# Patient Record
Sex: FEMALE | Race: BLACK OR AFRICAN AMERICAN | Employment: UNEMPLOYED | URBAN - METROPOLITAN AREA
[De-identification: names, ages, dates, MRNs, and addresses within clinical notes are randomized per-mention and may not be internally consistent; named-entity substitution may affect disease eponyms.]

---

## 2021-11-10 ENCOUNTER — HOSPITAL ENCOUNTER (INPATIENT)
Age: 51
LOS: 2 days | Discharge: HOME | End: 2021-11-12
Attending: STUDENT IN AN ORGANIZED HEALTH CARE EDUCATION/TRAINING PROGRAM | Admitting: STUDENT IN AN ORGANIZED HEALTH CARE EDUCATION/TRAINING PROGRAM
Payer: MEDICAID

## 2021-11-10 DIAGNOSIS — E87.6 HYPOKALEMIA: ICD-10-CM

## 2021-11-10 DIAGNOSIS — F10.10 ALCOHOL ABUSE: Primary | ICD-10-CM

## 2021-11-10 DIAGNOSIS — I10 HYPERTENSION: ICD-10-CM

## 2021-11-10 PROBLEM — R06.00 DYSPNEA: Status: ACTIVE | Noted: 2021-11-10

## 2021-11-10 PROCEDURE — 74011250637 HC RX REV CODE- 250/637: Performed by: STUDENT IN AN ORGANIZED HEALTH CARE EDUCATION/TRAINING PROGRAM

## 2021-11-10 PROCEDURE — G0378 HOSPITAL OBSERVATION PER HR: HCPCS

## 2021-11-10 PROCEDURE — 65270000029 HC RM PRIVATE

## 2021-11-10 RX ORDER — AMLODIPINE BESYLATE 10 MG/1
10 TABLET ORAL DAILY
Status: ON HOLD | COMMUNITY
End: 2021-11-12 | Stop reason: SDUPTHER

## 2021-11-10 RX ORDER — SODIUM CHLORIDE 0.9 % (FLUSH) 0.9 %
5-40 SYRINGE (ML) INJECTION AS NEEDED
Status: DISCONTINUED | OUTPATIENT
Start: 2021-11-10 | End: 2021-11-12 | Stop reason: HOSPADM

## 2021-11-10 RX ORDER — POLYETHYLENE GLYCOL 3350 17 G/17G
17 POWDER, FOR SOLUTION ORAL DAILY PRN
Status: DISCONTINUED | OUTPATIENT
Start: 2021-11-10 | End: 2021-11-12 | Stop reason: HOSPADM

## 2021-11-10 RX ORDER — ONDANSETRON 2 MG/ML
4 INJECTION INTRAMUSCULAR; INTRAVENOUS
Status: DISCONTINUED | OUTPATIENT
Start: 2021-11-10 | End: 2021-11-12 | Stop reason: HOSPADM

## 2021-11-10 RX ORDER — ACETAMINOPHEN 325 MG/1
650 TABLET ORAL
Status: DISCONTINUED | OUTPATIENT
Start: 2021-11-10 | End: 2021-11-12 | Stop reason: HOSPADM

## 2021-11-10 RX ORDER — ALPRAZOLAM 0.5 MG/1
1 TABLET ORAL ONCE
Status: COMPLETED | OUTPATIENT
Start: 2021-11-10 | End: 2021-11-10

## 2021-11-10 RX ORDER — ENOXAPARIN SODIUM 100 MG/ML
40 INJECTION SUBCUTANEOUS DAILY
Status: DISCONTINUED | OUTPATIENT
Start: 2021-11-11 | End: 2021-11-12 | Stop reason: HOSPADM

## 2021-11-10 RX ORDER — AMLODIPINE BESYLATE 5 MG/1
10 TABLET ORAL DAILY
Status: DISCONTINUED | OUTPATIENT
Start: 2021-11-11 | End: 2021-11-12 | Stop reason: HOSPADM

## 2021-11-10 RX ORDER — ESCITALOPRAM OXALATE 10 MG/1
10 TABLET ORAL DAILY
Status: DISCONTINUED | OUTPATIENT
Start: 2021-11-11 | End: 2021-11-12 | Stop reason: HOSPADM

## 2021-11-10 RX ORDER — ACETAMINOPHEN 650 MG/1
650 SUPPOSITORY RECTAL
Status: DISCONTINUED | OUTPATIENT
Start: 2021-11-10 | End: 2021-11-12 | Stop reason: HOSPADM

## 2021-11-10 RX ORDER — LISINOPRIL 40 MG/1
40 TABLET ORAL DAILY
Status: DISCONTINUED | OUTPATIENT
Start: 2021-11-11 | End: 2021-11-12 | Stop reason: HOSPADM

## 2021-11-10 RX ORDER — HYDROCHLOROTHIAZIDE 25 MG/1
25 TABLET ORAL DAILY
Status: DISCONTINUED | OUTPATIENT
Start: 2021-11-11 | End: 2021-11-12 | Stop reason: HOSPADM

## 2021-11-10 RX ORDER — SODIUM CHLORIDE 0.9 % (FLUSH) 0.9 %
5-40 SYRINGE (ML) INJECTION EVERY 8 HOURS
Status: DISCONTINUED | OUTPATIENT
Start: 2021-11-10 | End: 2021-11-12 | Stop reason: HOSPADM

## 2021-11-10 RX ORDER — ONDANSETRON 4 MG/1
4 TABLET, ORALLY DISINTEGRATING ORAL
Status: DISCONTINUED | OUTPATIENT
Start: 2021-11-10 | End: 2021-11-12 | Stop reason: HOSPADM

## 2021-11-10 RX ORDER — DIAZEPAM 10 MG/2ML
20 INJECTION INTRAMUSCULAR
Status: DISCONTINUED | OUTPATIENT
Start: 2021-11-10 | End: 2021-11-12 | Stop reason: HOSPADM

## 2021-11-10 RX ORDER — DIAZEPAM 10 MG/2ML
10 INJECTION INTRAMUSCULAR
Status: DISCONTINUED | OUTPATIENT
Start: 2021-11-10 | End: 2021-11-12 | Stop reason: HOSPADM

## 2021-11-10 RX ADMIN — ALPRAZOLAM 1 MG: 0.5 TABLET ORAL at 17:34

## 2021-11-10 RX ADMIN — Medication 10 ML: at 21:53

## 2021-11-10 RX ADMIN — Medication 10 ML: at 17:35

## 2021-11-10 NOTE — PROGRESS NOTES
TRANSFER - IN REPORT:    Verbal report received from [facility] (name) on Tegan Yo  being received from [facility] ED (unit) for routine progression of care      Report consisted of patients Situation, Background, Assessment and   Recommendations(SBAR). Information from the following report(s) SBAR, Kardex, ED Summary, Intake/Output, MAR and Recent Results was reviewed with the receiving nurse. Opportunity for questions and clarification was provided. Assessment completed upon patients arrival to unit and care assumed.

## 2021-11-10 NOTE — ACP (ADVANCE CARE PLANNING)
Advance Care Planning Note      NAME: Ottoniel Del Real   :  1970   MRN:  [id number]     Date/Time:  11/10/2021 4:46 PM    Active Diagnoses:  Hospital Problems  Date Reviewed: 2013          Codes Class Noted POA    Dyspnea ICD-10-CM: R06.00  ICD-9-CM: 786.09  11/10/2021 Unknown              These active diagnoses are of sufficient risk that focused discussion on advance care planning is indicated in order to allow the patient to thoughtfully consider personal goals of care, and if situations arise that prevent the ability to personally give input, to ensure appropriate representation of their personal desires for different levels and aggressiveness of care. Discussion:   Code status addressed and wants to be a Full Code. Patient wants central line and vasopressors if needed. Patient would also want a feeding tube, if needed, for nutritional support. Patient  would like to assign her mother Pattie Smith  as the surrogate decision maker. Persons present and participating in discussion: Luis Eduardo Campos MD      Time Spent:   Total time spent face-to-face in education and discussion:   16  minutes.          Luis Eduardo Aguilar MD   Hospitalist

## 2021-11-10 NOTE — H&P
Hospitalist Admission Note    NAME: Linda St   :  1970   MRN:  [id number]     Date/Time:  11/10/2021 1:38 PM    Patient PCP: Real Madison MD  ______________________________________________________________________  Given the patient's current clinical presentation, I have a high level of concern for decompensation if discharged from the emergency department. Complex decision making was performed, which includes reviewing the patient's available past medical records, laboratory results, and x-ray films. My assessment of this patient's clinical condition and my plan of care is as follows. Assessment / Plan:    Exertional dyspnea, diaphoresis, shortness of breath  -Presented with above symptoms x 2-3 days  -Panic attack vs ACS vs hot flash vs hyperthyroidism  -EKG negative for acute ischemic changes  -Chest x-ray shows no acute infiltrate  -Rapid COVID and influenza tests -ve  -Admit for observation and ACS rule out  -Initial troponin negative at outside ED. Will trend troponin.  -We will obtain echocardiogram   -Check TSH, proBNP  -Telemetry monitoring    Electrolyte imbalance: Hypokalemia, hypomagnesemia  -Potassium 3 and mag 1.1 at outside ED  -Likely secondary to HCTZ  -Replaced  -We will check labs in a.m. Hypertension  -Continue home medications    ? EtOH abuse  -History of EtOH abuse per chart review. Patient reports drinking only 1 to 2 glasses of wine about 4 times per week, drinks vodka occasionally  -Tremors on exam and appears very anxious  -We will give a dose of Ativan. Start CIWA protocol. IV fluids with multivitamins for 24 hours. Anxiety/depression  -Continue Lexapro    Code Status: Full code  Surrogate Decision Maker: Her mother kennedy Marie    DVT Prophylaxis: Lovenox  GI Prophylaxis: not indicated    Baseline:  Independent      Subjective:   CHIEF COMPLAINT: Diaphoresis, shaking, shortness of breath    HISTORY OF PRESENT ILLNESS:     Linda St is a 46 y.o.  female with past medical history of hypertension and anxiety who presented with complaint of generalized weakness, dyspnea, diaphoresis that has been going on for the last couple of days. Patient is transferred from [address] [place] [address] emergency at [facility]. Symptoms srated 3 days ago on Monday night when she had an episode of vomiting/ She woke up the next day feeling quesy. She went to the garage to grab gingerale and on her way back she suddenly started feeling shaky, sweating profusely, and her legs were \"weak like noodles\". She had difficulty breathing and was thought she was going to pass out. She was panting when she gets back to her room. She sat down, started feeling better about 20 minutes later. She denies chest pain or palpitation. She experienced similar symptoms this morning when she was walking from the kitchen to her bed room prompting her to come to the ED. patient has history of anxiety but denies recent major stressor or history of panic attacks. She was notably shaking and anxious at the time of my evaluation. She drinks alcohol 1 to 2 glasses of wine up to 4 times per week and drinks vodka occasionally. Denies history of alcohol withdrawal symptoms. She denies self or family history of MI  Her last menses was 3 months ago. At [facility] ED her labs were remarkable for potassium of 3,, magnesium of 1.1, and lactic acid of 4.8. Her creatinine was elevated at 1.2. She was given IV fluid and her electrolytes were replaced. Repeat lactate 1.8. Chest x-ray was negative for acute infiltrate. EKG shows normal sinus rhythm. We were asked to admit for work up and evaluation of the above problems. Past Medical History:   Diagnosis Date    Anxiety     Depression     Hypertension     Ill-defined condition     ETOH abuse        No past surgical history on file.     Social History     Tobacco Use    Smoking status: Never Smoker    Smokeless tobacco: Not on file Substance Use Topics    Alcohol use: Yes     Alcohol/week: 0.0 standard drinks     Comment: occasional        Family History   Problem Relation Age of Onset    Elevated Lipids Mother     Hypertension Mother    [facility] Elevated Lipids Father     Hypertension Brother    [facility] Elevated Lipids Brother     Hypertension Sister     Hypertension Sister      Allergies   Allergen Reactions    Codeine Nausea and Vomiting    Bactrim [Sulfamethoprim Ds] Rash     Severe whole body rash. Prior to Admission medications    Medication Sig Start Date End Date Taking? Authorizing Provider   atenolol (TENORMIN) 100 mg tablet Take 100 mg by mouth daily. Provider, [place]   potassium chloride (K-DUR, KLOR-CON) 20 mEq tablet Take 1 tablet by mouth two (2) times a day. 10/30/14   Jackson Arango MD   hydrochlorothiazide (HYDRODIURIL) 25 mg tablet Take 25 mg by mouth daily. Other, MD Chuyita   lisinopril (PRINIVIL, ZESTRIL) 40 mg tablet Take 1 tablet by mouth daily. 10/8/14   Jackson Arango MD       REVIEW OF SYSTEMS:     I am not able to complete the review of systems because:    The patient is intubated and sedated    The patient has altered mental status due to his acute medical problems    The patient has baseline aphasia from prior stroke(s)    The patient has baseline dementia and is not reliable historian    The patient is in acute medical distress and unable to provide information           Total of 12 systems reviewed as follows:       POSITIVE= underlined text  Negative = text not underlined  General:  fever, chills, sweats, generalized weakness, weight loss/gain,      loss of appetite   Eyes:    blurred vision, eye pain, loss of vision, double vision  ENT:    rhinorrhea, pharyngitis   Respiratory:   cough, sputum production, SOB, QUIROZ, wheezing, pleuritic pain   Cardiology:   chest pain, palpitations, orthopnea, PND, edema, syncope   Gastrointestinal:  abdominal pain , N/V, diarrhea, dysphagia, constipation, bleeding   Genitourinary:  frequency, urgency, dysuria, hematuria, incontinence   Muskuloskeletal :  arthralgia, myalgia, back pain  Hematology:  easy bruising, nose or gum bleeding, lymphadenopathy   Dermatological: rash, ulceration, pruritis, color change / jaundice  Endocrine:   hot flashes or polydipsia   Neurological:  headache, dizziness, confusion, focal weakness, paresthesia,     Speech difficulties, memory loss, gait difficulty  Psychological: Feelings of anxiety, depression, agitation    Objective:   VITALS:    There were no vitals taken for this visit. PHYSICAL EXAM:    General:    Alert, cooperative, no distress, appears stated age. HEENT: Atraumatic, anicteric sclerae, pink conjunctivae     No oral ulcers, mucosa moist, throat clear, dentition fair  Neck:  Supple, symmetrical,  thyroid: non tender  Lungs:   Clear to auscultation bilaterally. No Wheezing or Rhonchi. No rales. Chest wall:  No tenderness  No Accessory muscle use. Heart:   Regular  rhythm,  No  murmur   No edema  Abdomen:   Soft, non-tender. Not distended. Bowel sounds normal  Extremities: No cyanosis. No clubbing,      Skin turgor normal, Capillary refill normal, Radial dial pulse 2+  Skin:     Not pale. Not Jaundiced  No rashes   Psych:  Good insight. Not depressed. Anxious  Neurologic: EOMs intact. No facial asymmetry. No aphasia or slurred speech. Symmetrical strength, Sensation grossly intact. Alert and oriented X 4.   Upper extremity fine tremors    _______________________________________________________________________  Care Plan discussed with:    Comments   Patient x    Family      RN     Care Manager                    Consultant:      _______________________________________________________________________  Expected  Disposition:   Home with Family x   HH/PT/OT/RN    SNF/LTC    DANIEL    ________________________________________________________________________  TOTAL TIME:  39 Minutes    Critical Care Provided Minutes non procedure based      Comments    x Reviewed previous records   >50% of visit spent in counseling and coordination of care x Discussion with patient and/or family and questions answered       ________________________________________________________________________  Signed: Elvira Galloway MD    Procedures: see electronic medical records for all procedures/Xrays and details which were not copied into this note but were reviewed prior to creation of Plan. LAB DATA REVIEWED:    No results found for this or any previous visit (from the past 24 hour(s)).

## 2021-11-11 ENCOUNTER — APPOINTMENT (OUTPATIENT)
Dept: NON INVASIVE DIAGNOSTICS | Age: 51
End: 2021-11-11
Attending: STUDENT IN AN ORGANIZED HEALTH CARE EDUCATION/TRAINING PROGRAM
Payer: MEDICAID

## 2021-11-11 LAB
ALBUMIN SERPL-MCNC: 3.9 G/DL (ref 3.5–5)
ALBUMIN/GLOB SERPL: 1.1 {RATIO} (ref 1.1–2.2)
ALP SERPL-CCNC: 60 U/L (ref 45–117)
ALT SERPL-CCNC: 50 U/L (ref 12–78)
ANION GAP SERPL CALC-SCNC: 9 MMOL/L (ref 5–15)
AST SERPL-CCNC: 74 U/L (ref 15–37)
BASOPHILS # BLD: 0 K/UL (ref 0–0.1)
BASOPHILS NFR BLD: 0 % (ref 0–1)
BILIRUB DIRECT SERPL-MCNC: 0.2 MG/DL (ref 0–0.2)
BILIRUB SERPL-MCNC: 0.7 MG/DL (ref 0.2–1)
BNP SERPL-MCNC: 118 PG/ML
BUN SERPL-MCNC: 14 MG/DL (ref 6–20)
BUN/CREAT SERPL: 14 (ref 12–20)
CALCIUM SERPL-MCNC: 8.3 MG/DL (ref 8.5–10.1)
CHLORIDE SERPL-SCNC: 102 MMOL/L (ref 97–108)
CO2 SERPL-SCNC: 24 MMOL/L (ref 21–32)
CREAT SERPL-MCNC: 0.98 MG/DL (ref 0.55–1.02)
D DIMER PPP FEU-MCNC: 0.23 MG/L FEU (ref 0–0.65)
DIFFERENTIAL METHOD BLD: ABNORMAL
ECHO AV AREA PEAK VELOCITY: 1.91 CM2
ECHO AV AREA PEAK VELOCITY: 1.92 CM2
ECHO AV AREA PEAK VELOCITY: 1.93 CM2
ECHO AV AREA PEAK VELOCITY: 1.94 CM2
ECHO AV AREA VTI: 1.9 CM2
ECHO AV AREA/BSA VTI: 1.1 CM2/M2
ECHO AV MEAN GRADIENT: 4 MMHG
ECHO AV PEAK GRADIENT: 6.52 MMHG
ECHO AV PEAK GRADIENT: 6.59 MMHG
ECHO AV PEAK VELOCITY: 127.65 CM/S
ECHO AV PEAK VELOCITY: 128.36 CM/S
ECHO AV VTI: 20.2 CM
ECHO LA MAJOR AXIS: 2.56 CM
ECHO LA MINOR AXIS: 1.47 CM
ECHO LV E' LATERAL VELOCITY: 5.11 CM/S
ECHO LV E' SEPTAL VELOCITY: 5.08 CM/S
ECHO LV EDV A4C: 77.76 ML
ECHO LV EDV INDEX A4C: 44.7 ML/M2
ECHO LV EJECTION FRACTION A4C: 60 PERCENT
ECHO LV ESV A4C: 31.08 ML
ECHO LV ESV INDEX A4C: 17.9 ML/M2
ECHO LV INTERNAL DIMENSION DIASTOLIC: 3.54 CM (ref 3.9–5.3)
ECHO LV INTERNAL DIMENSION SYSTOLIC: 2.6 CM
ECHO LV IVSD: 0.88 CM (ref 0.6–0.9)
ECHO LV MASS 2D: 91.1 G (ref 67–162)
ECHO LV MASS INDEX 2D: 52.4 G/M2 (ref 43–95)
ECHO LV POSTERIOR WALL DIASTOLIC: 0.93 CM (ref 0.6–0.9)
ECHO LVOT DIAM: 1.85 CM
ECHO LVOT PEAK GRADIENT: 3.32 MMHG
ECHO LVOT PEAK GRADIENT: 3.37 MMHG
ECHO LVOT PEAK VELOCITY: 91.08 CM/S
ECHO LVOT PEAK VELOCITY: 91.84 CM/S
ECHO LVOT SV: 38.4 ML
ECHO LVOT VTI: 14.23 CM
ECHO PV MAX VELOCITY: 139.11 CM/S
ECHO PV MAX VELOCITY: 143.41 CM/S
ECHO PV MEAN GRADIENT: 3.76 MMHG
ECHO PV PEAK INSTANTANEOUS GRADIENT SYSTOLIC: 7.74 MMHG
ECHO PV PEAK INSTANTANEOUS GRADIENT SYSTOLIC: 8.23 MMHG
EOSINOPHIL # BLD: 0 K/UL (ref 0–0.4)
EOSINOPHIL NFR BLD: 0 % (ref 0–7)
ERYTHROCYTE [DISTWIDTH] IN BLOOD BY AUTOMATED COUNT: 15.3 % (ref 11.5–14.5)
GLOBULIN SER CALC-MCNC: 3.6 G/DL (ref 2–4)
GLUCOSE SERPL-MCNC: 87 MG/DL (ref 65–100)
HCT VFR BLD AUTO: 33.7 % (ref 35–47)
HGB BLD-MCNC: 11.1 G/DL (ref 11.5–16)
IMM GRANULOCYTES # BLD AUTO: 0.1 K/UL (ref 0–0.04)
IMM GRANULOCYTES NFR BLD AUTO: 1 % (ref 0–0.5)
LVOT MG: 2.17 MMHG
LYMPHOCYTES # BLD: 1.9 K/UL (ref 0.8–3.5)
LYMPHOCYTES NFR BLD: 26 % (ref 12–49)
MAGNESIUM SERPL-MCNC: 1.3 MG/DL (ref 1.6–2.4)
MCH RBC QN AUTO: 27.6 PG (ref 26–34)
MCHC RBC AUTO-ENTMCNC: 32.9 G/DL (ref 30–36.5)
MCV RBC AUTO: 83.8 FL (ref 80–99)
MONOCYTES # BLD: 0.4 K/UL (ref 0–1)
MONOCYTES NFR BLD: 6 % (ref 5–13)
NEUTS SEG # BLD: 4.9 K/UL (ref 1.8–8)
NEUTS SEG NFR BLD: 67 % (ref 32–75)
NRBC # BLD: 0 K/UL (ref 0–0.01)
NRBC BLD-RTO: 0 PER 100 WBC
PLATELET # BLD AUTO: 214 K/UL (ref 150–400)
PMV BLD AUTO: 9.5 FL (ref 8.9–12.9)
POTASSIUM SERPL-SCNC: 3.6 MMOL/L (ref 3.5–5.1)
PROT SERPL-MCNC: 7.5 G/DL (ref 6.4–8.2)
RBC # BLD AUTO: 4.02 M/UL (ref 3.8–5.2)
SODIUM SERPL-SCNC: 135 MMOL/L (ref 136–145)
TROPONIN-HIGH SENSITIVITY: <4 NG/L (ref 0–51)
TSH SERPL DL<=0.05 MIU/L-ACNC: 2.38 UIU/ML (ref 0.36–3.74)
WBC # BLD AUTO: 7.4 K/UL (ref 3.6–11)

## 2021-11-11 PROCEDURE — 74011000250 HC RX REV CODE- 250: Performed by: STUDENT IN AN ORGANIZED HEALTH CARE EDUCATION/TRAINING PROGRAM

## 2021-11-11 PROCEDURE — 84443 ASSAY THYROID STIM HORMONE: CPT

## 2021-11-11 PROCEDURE — 84484 ASSAY OF TROPONIN QUANT: CPT

## 2021-11-11 PROCEDURE — 93306 TTE W/DOPPLER COMPLETE: CPT | Performed by: INTERNAL MEDICINE

## 2021-11-11 PROCEDURE — 74011250636 HC RX REV CODE- 250/636: Performed by: INTERNAL MEDICINE

## 2021-11-11 PROCEDURE — 93306 TTE W/DOPPLER COMPLETE: CPT

## 2021-11-11 PROCEDURE — 74011250636 HC RX REV CODE- 250/636: Performed by: STUDENT IN AN ORGANIZED HEALTH CARE EDUCATION/TRAINING PROGRAM

## 2021-11-11 PROCEDURE — 74011250637 HC RX REV CODE- 250/637: Performed by: STUDENT IN AN ORGANIZED HEALTH CARE EDUCATION/TRAINING PROGRAM

## 2021-11-11 PROCEDURE — 80076 HEPATIC FUNCTION PANEL: CPT

## 2021-11-11 PROCEDURE — 74011250637 HC RX REV CODE- 250/637: Performed by: INTERNAL MEDICINE

## 2021-11-11 PROCEDURE — G0378 HOSPITAL OBSERVATION PER HR: HCPCS

## 2021-11-11 PROCEDURE — 65270000029 HC RM PRIVATE

## 2021-11-11 PROCEDURE — 85025 COMPLETE CBC W/AUTO DIFF WBC: CPT

## 2021-11-11 PROCEDURE — 80048 BASIC METABOLIC PNL TOTAL CA: CPT

## 2021-11-11 PROCEDURE — 83880 ASSAY OF NATRIURETIC PEPTIDE: CPT

## 2021-11-11 PROCEDURE — 36415 COLL VENOUS BLD VENIPUNCTURE: CPT

## 2021-11-11 PROCEDURE — 85379 FIBRIN DEGRADATION QUANT: CPT

## 2021-11-11 PROCEDURE — 83735 ASSAY OF MAGNESIUM: CPT

## 2021-11-11 RX ORDER — CHLORDIAZEPOXIDE HYDROCHLORIDE 5 MG/1
10 CAPSULE, GELATIN COATED ORAL 3 TIMES DAILY
Status: DISCONTINUED | OUTPATIENT
Start: 2021-11-11 | End: 2021-11-12 | Stop reason: HOSPADM

## 2021-11-11 RX ORDER — ASPIRIN 325 MG/1
100 TABLET, FILM COATED ORAL DAILY
Status: DISCONTINUED | OUTPATIENT
Start: 2021-11-11 | End: 2021-11-12 | Stop reason: HOSPADM

## 2021-11-11 RX ORDER — MAGNESIUM SULFATE HEPTAHYDRATE 40 MG/ML
2 INJECTION, SOLUTION INTRAVENOUS ONCE
Status: COMPLETED | OUTPATIENT
Start: 2021-11-11 | End: 2021-11-12

## 2021-11-11 RX ADMIN — CHLORDIAZEPOXIDE HYDROCHLORIDE 10 MG: 5 CAPSULE ORAL at 11:47

## 2021-11-11 RX ADMIN — AMLODIPINE BESYLATE 10 MG: 5 TABLET ORAL at 10:09

## 2021-11-11 RX ADMIN — THIAMINE HYDROCHLORIDE: 100 INJECTION, SOLUTION INTRAMUSCULAR; INTRAVENOUS at 11:47

## 2021-11-11 RX ADMIN — LISINOPRIL 40 MG: 40 TABLET ORAL at 10:09

## 2021-11-11 RX ADMIN — HYDROCHLOROTHIAZIDE 25 MG: 25 TABLET ORAL at 10:09

## 2021-11-11 RX ADMIN — Medication 10 ML: at 21:55

## 2021-11-11 RX ADMIN — MAGNESIUM SULFATE HEPTAHYDRATE 2 G: 40 INJECTION, SOLUTION INTRAVENOUS at 11:50

## 2021-11-11 RX ADMIN — THIAMINE HCL TAB 100 MG 100 MG: 100 TAB at 11:47

## 2021-11-11 RX ADMIN — ACETAMINOPHEN 650 MG: 325 TABLET ORAL at 10:09

## 2021-11-11 RX ADMIN — Medication 10 ML: at 06:08

## 2021-11-11 RX ADMIN — ESCITALOPRAM OXALATE 10 MG: 10 TABLET ORAL at 10:09

## 2021-11-11 RX ADMIN — CHLORDIAZEPOXIDE HYDROCHLORIDE 10 MG: 5 CAPSULE ORAL at 17:29

## 2021-11-11 RX ADMIN — ENOXAPARIN SODIUM 40 MG: 100 INJECTION SUBCUTANEOUS at 10:09

## 2021-11-11 RX ADMIN — Medication 10 ML: at 14:00

## 2021-11-11 RX ADMIN — CHLORDIAZEPOXIDE HYDROCHLORIDE 10 MG: 5 CAPSULE ORAL at 21:53

## 2021-11-11 RX ADMIN — DIAZEPAM 20 MG: 5 INJECTION, SOLUTION INTRAMUSCULAR; INTRAVENOUS at 10:08

## 2021-11-11 NOTE — PROGRESS NOTES
Transition of Care Plan:    RUR: Observation  Disposition: Home w/ follow-up appts  Follow up appointments: PCP (needs new pt appt) & specialists as indicated  DME needed: None  Transportation at Discharge: Mother  Josh Nava or means to access home:  Family has access  IM Medicare Letter: N/A  Is patient a BCPI-A Bundle: No       If yes, was Bundle Letter given?: N/A    Caregiver Contact: MotherGayle Whyte, [phone]  Discharge Caregiver contacted prior to discharge? CM acknowledged observation status. Oral and Written notification given to patient and/or caregiver informing them that they are currently an Outpatient receiving care in our facility. Outpatient services include Observation Services. CM reviewed pt's chart. [facility]; pt started on CIWA protocol d/t hx of EtOH misuse. Per chart review, pt has hx of voluntary psych placement at [facility] (2013). CM completed initial assessment by bedside phone. Pt confirmed demographic information- updated emergency contacts & reports PCP on file is not accurate. Pt reports moving from [facility] & has not found a local PCP; agreeable to CM providing list of BS providers & securing new-pt appt w/ providers close to her home address. Pt lives w/ her parents in single level/ 2 VEGA house. Independent at baseline; does not drive. Family assists w/ transportation. [facility] or SNF/ rehab. No current needs identified. Pt reports her mother tran transport at discharge. [place] [facility]. No further questions or concerns reported at this time. Unit CM to follow. Reason for Admission: Dyspnea                    RUR Score:     Obs                 Plan for utilizing home health:     No needs identified at this time    PCP: First and Last name: None- will need new PCP    Name of Practice:    Are you a current patient: Yes/No:    Approximate date of last visit:    Can you participate in a virtual visit with your PCP:                     Current Advanced Directive/Advance Care Plan: Full Code  Advance Care Planning     General Advance Care Planning (ACP) Conversation  Date of Conversation: 11/11/2021  Conducted with: Patient with Andrés 153:   No healthcare decision makers have been documented. Click here to complete [address] including selection of the Healthcare Decision Maker Relationship (ie \"Primary\")    Today we discussed [address]. The patient is considering options. Content/Action Overview:   Has NO ACP documents/care preferences - information provided, considering goals and options  Reviewed DNR/DNI and patient elects Full Code (Attempt Resuscitation)    Length of Voluntary ACP Conversation in minutes:  <16 minutes (Non-Billable)    [address] Management Interventions  PCP Verified by CM:  (pt has no PCP)  Palliative Care Criteria Met (RRAT>21 & CHF Dx)?: No  Mode of Transport at Discharge:  Other (see comment) (Parents)  Transition of Care Consult (CM Consult): Discharge Planning  Discharge Durable Medical Equipment: No  Physical Therapy Consult: No  Occupational Therapy Consult: No  Speech Therapy Consult: No  Support Systems: Parent(s)  Confirm Follow Up Transport: Family  The Plan for Transition of Care is Related to the Following Treatment Goals : Home w/ follow-up appts  The Patient and/or Patient Representative was Provided with a Choice of Provider and Agrees with the Discharge Plan?: Yes  Discharge Location  Discharge Placement: Home with family assistance    KRUPA Childress  Care Management

## 2021-11-11 NOTE — PROGRESS NOTES
Hospitalist Progress Note    NAME: Cristopher Vera   :  1970   MRN:  [id number]       Assessment / Plan:  Exertional dyspnea, diaphoresis, shortness of breath  Suspect alcohol withdrawal  -Presented with above symptoms x 2-3 days  -Panic attack vs ACS vs hot flash vs hyperthyroidism   -EKG/troponin negative. CXR negative. -Vaccinated for COVID-19. Rapid Covid and influenza test negative    -Suspect her symptoms are multifactorial including likely alcohol withdrawal  -Check D-dimer, troponin  -Check LFT, add on from morning labs  -Echo pending       Electrolyte imbalance: Hypokalemia, hypomagnesemia, suspect from alcohol  -Potassium 3 and mag 1.1 at outside ED  -Likely secondary to HCTZ/alcohol  -Replacing     Hypertension  -Continue home medications     ? EtOH abuse  -P.o. thiamine  -Librium 10 mg scheduled, continue CIWA protocol     Anxiety/depression  -Continue Lexapro     Code Status: Full code  Surrogate Decision Maker: Her mother Nikole Haji     DVT Prophylaxis: Lovenox  GI Prophylaxis: not indicated     Baseline: Independent    Anticipated discharge date      Subjective:     Chief Complaint / Reason for Physician Visit  \" She seems very anxious, has minimal tremors. Denies significant alcohol use, but she is not saying excessive amount of alcohol\". Discussed with RN events overnight. Review of Systems:  Symptom Y/N Comments  Symptom Y/N Comments   Fever/Chills    Chest Pain     Poor Appetite    Edema     Cough    Abdominal Pain     Sputum    Joint Pain     SOB/QUIROZ    Pruritis/Rash     Nausea/vomit    Tolerating PT/OT     Diarrhea    Tolerating Diet     Constipation    Other       Could NOT obtain due to:      Objective:     VITALS:   Last 24hrs VS reviewed since prior progress note.  Most recent are:  Patient Vitals for the past 24 hrs:   Temp Pulse Resp BP SpO2   21 0857 98.3 °F (36.8 °C) (!) 116 20 (!) 134/95 98 %   21 0316 98.5 °F (36.9 °C) (!) 126 22 (!) 140/94 98 % 11/10/21 2334 97.6 °F (36.4 °C) (!) 105 18 124/79 97 %   11/10/21 2107 97.5 °F (36.4 °C) (!) 106 18 130/83 98 %   11/10/21 1622 98.7 °F (37.1 °C) (!) 112 18 128/85 95 %   11/10/21 1150 98.2 °F (36.8 °C) 100 18 122/87 95 %     No intake or output data in the 24 hours ending 11/11/21 1059     I had a face to face encounter and independently examined this patient on 11/11/2021, as outlined below:  PHYSICAL EXAM:  General: WD, WN. Alert, cooperative, no acute distress    EENT:  EOMI. Anicteric sclerae. MMM  Resp:  CTA bilaterally, no wheezing or rales. No accessory muscle use  CV:  Regular  rhythm,  No edema  GI:  Soft, Non distended, Non tender. +Bowel sounds  Neurologic:  Alert and oriented X 3, normal speech,   Psych:   Good insight. anxious+  Skin:  No rashes. No jaundice    Reviewed most current lab test results and cultures  YES  Reviewed most current radiology test results   YES  Review and summation of old records today    NO  Reviewed patient's current orders and MAR    YES  PMH/ reviewed - no change compared to H&P  ________________________________________________________________________  Care Plan discussed with:    Comments   Patient     Family      RN     Care Manager     Consultant                        Multidiciplinary team rounds were held today with , nursing, pharmacist and clinical coordinator. Patient's plan of care was discussed; medications were reviewed and discharge planning was addressed.      ________________________________________________________________________  Total NON critical care TIME:  23   Minutes    Total CRITICAL CARE TIME Spent:   Minutes non procedure based      Comments   >50% of visit spent in counseling and coordination of care     ________________________________________________________________________  Eren Mixon MD     Procedures: see electronic medical records for all procedures/Xrays and details which were not copied into this note but were reviewed prior to creation of Plan. LABS:  I reviewed today's most current labs and imaging studies.   Pertinent labs include:  Recent Labs     11/11/21 0115   WBC 7.4   HGB 11.1*   HCT 33.7*        Recent Labs     11/11/21 0115   *   K 3.6      CO2 24   GLU 87   BUN 14   CREA 0.98   CA 8.3*   MG 1.3*       Signed: Thomas Grajeda MD

## 2021-11-11 NOTE — PROGRESS NOTES
Bedside shift change report given to Kim Reynaga (oncoming nurse) by Keenan Vila RN (offgoing nurse). Report included the following information SBAR, Kardex, Intake/Output, MAR and Recent Results    Shift worked: 7am-7pm     Shift summary and any significant changes:    Patient admitted today to the unit. No significant changes noted.       Concerns for physician to address: Tachycardia      Zone phone for oncoming shift:  N/A

## 2021-11-12 VITALS
HEIGHT: 65 IN | DIASTOLIC BLOOD PRESSURE: 90 MMHG | HEART RATE: 115 BPM | BODY MASS INDEX: 24.49 KG/M2 | TEMPERATURE: 98.6 F | WEIGHT: 147 LBS | SYSTOLIC BLOOD PRESSURE: 144 MMHG | OXYGEN SATURATION: 96 % | RESPIRATION RATE: 18 BRPM

## 2021-11-12 LAB
ALBUMIN SERPL-MCNC: 4 G/DL (ref 3.5–5)
ALBUMIN/GLOB SERPL: 1 {RATIO} (ref 1.1–2.2)
ALP SERPL-CCNC: 58 U/L (ref 45–117)
ALT SERPL-CCNC: 54 U/L (ref 12–78)
ANION GAP SERPL CALC-SCNC: 6 MMOL/L (ref 5–15)
AST SERPL-CCNC: 73 U/L (ref 15–37)
BASOPHILS # BLD: 0 K/UL (ref 0–0.1)
BASOPHILS NFR BLD: 0 % (ref 0–1)
BILIRUB DIRECT SERPL-MCNC: 0.1 MG/DL (ref 0–0.2)
BILIRUB SERPL-MCNC: 0.6 MG/DL (ref 0.2–1)
BUN SERPL-MCNC: 15 MG/DL (ref 6–20)
BUN/CREAT SERPL: 15 (ref 12–20)
CALCIUM SERPL-MCNC: 8.7 MG/DL (ref 8.5–10.1)
CHLORIDE SERPL-SCNC: 102 MMOL/L (ref 97–108)
CO2 SERPL-SCNC: 26 MMOL/L (ref 21–32)
CREAT SERPL-MCNC: 0.97 MG/DL (ref 0.55–1.02)
DIFFERENTIAL METHOD BLD: ABNORMAL
EOSINOPHIL # BLD: 0 K/UL (ref 0–0.4)
EOSINOPHIL NFR BLD: 0 % (ref 0–7)
ERYTHROCYTE [DISTWIDTH] IN BLOOD BY AUTOMATED COUNT: 15.1 % (ref 11.5–14.5)
GLOBULIN SER CALC-MCNC: 4.1 G/DL (ref 2–4)
GLUCOSE SERPL-MCNC: 111 MG/DL (ref 65–100)
HCT VFR BLD AUTO: 37 % (ref 35–47)
HGB BLD-MCNC: 12.1 G/DL (ref 11.5–16)
IMM GRANULOCYTES # BLD AUTO: 0.1 K/UL (ref 0–0.04)
IMM GRANULOCYTES NFR BLD AUTO: 1 % (ref 0–0.5)
LYMPHOCYTES # BLD: 1.5 K/UL (ref 0.8–3.5)
LYMPHOCYTES NFR BLD: 22 % (ref 12–49)
MAGNESIUM SERPL-MCNC: 2.4 MG/DL (ref 1.6–2.4)
MCH RBC QN AUTO: 27.6 PG (ref 26–34)
MCHC RBC AUTO-ENTMCNC: 32.7 G/DL (ref 30–36.5)
MCV RBC AUTO: 84.3 FL (ref 80–99)
MONOCYTES # BLD: 0.5 K/UL (ref 0–1)
MONOCYTES NFR BLD: 7 % (ref 5–13)
NEUTS SEG # BLD: 4.7 K/UL (ref 1.8–8)
NEUTS SEG NFR BLD: 70 % (ref 32–75)
NRBC # BLD: 0 K/UL (ref 0–0.01)
NRBC BLD-RTO: 0 PER 100 WBC
PLATELET # BLD AUTO: 217 K/UL (ref 150–400)
PMV BLD AUTO: 9.4 FL (ref 8.9–12.9)
POTASSIUM SERPL-SCNC: 3.5 MMOL/L (ref 3.5–5.1)
PROT SERPL-MCNC: 8.1 G/DL (ref 6.4–8.2)
RBC # BLD AUTO: 4.39 M/UL (ref 3.8–5.2)
SODIUM SERPL-SCNC: 134 MMOL/L (ref 136–145)
WBC # BLD AUTO: 6.8 K/UL (ref 3.6–11)

## 2021-11-12 PROCEDURE — G0378 HOSPITAL OBSERVATION PER HR: HCPCS

## 2021-11-12 PROCEDURE — 94760 N-INVAS EAR/PLS OXIMETRY 1: CPT

## 2021-11-12 PROCEDURE — 74011250637 HC RX REV CODE- 250/637: Performed by: INTERNAL MEDICINE

## 2021-11-12 PROCEDURE — 83735 ASSAY OF MAGNESIUM: CPT

## 2021-11-12 PROCEDURE — 80076 HEPATIC FUNCTION PANEL: CPT

## 2021-11-12 PROCEDURE — 80048 BASIC METABOLIC PNL TOTAL CA: CPT

## 2021-11-12 PROCEDURE — 36415 COLL VENOUS BLD VENIPUNCTURE: CPT

## 2021-11-12 PROCEDURE — 74011250636 HC RX REV CODE- 250/636: Performed by: STUDENT IN AN ORGANIZED HEALTH CARE EDUCATION/TRAINING PROGRAM

## 2021-11-12 PROCEDURE — 85025 COMPLETE CBC W/AUTO DIFF WBC: CPT

## 2021-11-12 PROCEDURE — 74011250637 HC RX REV CODE- 250/637: Performed by: STUDENT IN AN ORGANIZED HEALTH CARE EDUCATION/TRAINING PROGRAM

## 2021-11-12 RX ORDER — LORAZEPAM 1 MG/1
1 TABLET ORAL
Qty: 6 TABLET | Refills: 0 | Status: SHIPPED | OUTPATIENT
Start: 2021-11-12 | End: 2021-11-15

## 2021-11-12 RX ORDER — AMLODIPINE BESYLATE 10 MG/1
10 TABLET ORAL DAILY
Qty: 30 TABLET | Refills: 1 | Status: SHIPPED | OUTPATIENT
Start: 2021-11-12

## 2021-11-12 RX ORDER — ESCITALOPRAM OXALATE 10 MG/1
10 TABLET ORAL DAILY
Qty: 30 TABLET | Refills: 0 | Status: SHIPPED | OUTPATIENT
Start: 2021-11-13

## 2021-11-12 RX ORDER — ASPIRIN 325 MG/1
100 TABLET, FILM COATED ORAL DAILY
Qty: 30 TABLET | Refills: 0 | Status: SHIPPED | OUTPATIENT
Start: 2021-11-13

## 2021-11-12 RX ORDER — POTASSIUM CHLORIDE 20 MEQ/1
20 TABLET, EXTENDED RELEASE ORAL DAILY
Qty: 15 TABLET | Refills: 0 | Status: SHIPPED | OUTPATIENT
Start: 2021-11-12

## 2021-11-12 RX ORDER — HYDROCHLOROTHIAZIDE 25 MG/1
25 TABLET ORAL DAILY
Qty: 30 TABLET | Refills: 0 | Status: SHIPPED | OUTPATIENT
Start: 2021-11-12

## 2021-11-12 RX ORDER — LISINOPRIL 40 MG/1
40 TABLET ORAL DAILY
Qty: 30 TABLET | Refills: 1 | Status: SHIPPED | OUTPATIENT
Start: 2021-11-12

## 2021-11-12 RX ADMIN — CHLORDIAZEPOXIDE HYDROCHLORIDE 10 MG: 5 CAPSULE ORAL at 08:37

## 2021-11-12 RX ADMIN — LISINOPRIL 40 MG: 40 TABLET ORAL at 08:37

## 2021-11-12 RX ADMIN — THIAMINE HCL TAB 100 MG 100 MG: 100 TAB at 08:37

## 2021-11-12 RX ADMIN — HYDROCHLOROTHIAZIDE 25 MG: 25 TABLET ORAL at 08:37

## 2021-11-12 RX ADMIN — ENOXAPARIN SODIUM 40 MG: 100 INJECTION SUBCUTANEOUS at 08:37

## 2021-11-12 RX ADMIN — AMLODIPINE BESYLATE 10 MG: 5 TABLET ORAL at 08:37

## 2021-11-12 RX ADMIN — ONDANSETRON 4 MG: 4 TABLET, ORALLY DISINTEGRATING ORAL at 08:42

## 2021-11-12 RX ADMIN — Medication 10 ML: at 06:04

## 2021-11-12 RX ADMIN — ESCITALOPRAM OXALATE 10 MG: 10 TABLET ORAL at 08:37

## 2021-11-12 RX ADMIN — ACETAMINOPHEN 650 MG: 325 TABLET ORAL at 08:37

## 2021-11-12 NOTE — PROGRESS NOTES
Transition of Care Plan:     RUR:   Observation  Disposition: Home w/ follow-up appts  Follow up appointments: PCP (needs new pt appt) - appointment scheduled and AVS updated   DME needed: None  Transportation at Discharge: Mother  Jaqueline Altamirano or means to access home:  Family has access  IM Medicare Letter: N/A  Is patient a BCPI-A Bundle: No                  If yes, was Bundle Letter given?: N/A    Caregiver Contact: Mother- Doretha Loyola, [phone]  Discharge Caregiver contacted prior to discharge? Initial Note: Chart reviewed. CM acknowledged discharge order. CM attempted to review discharge plan with pt however pt had already left the hospital. PCP follow up appointment scheduled. AVS previously updated. SMART tool clipped to pt door. Care Management Interventions  PCP Verified by CM: Yes  Palliative Care Criteria Met (RRAT>21 & CHF Dx)?: No  Mode of Transport at Discharge:  Other (see comment) (Mother to transport. )  Transition of Care Consult (CM Consult): Discharge Planning  Discharge Durable Medical Equipment: No  Physical Therapy Consult: No  Occupational Therapy Consult: No  Speech Therapy Consult: No  Support Systems: Parent(s)  Confirm Follow Up Transport: Family  The Plan for Transition of Care is Related to the Following Treatment Goals : Home w/ follow-up appts  The Patient and/or Patient Representative was Provided with a Choice of Provider and Agrees with the Discharge Plan?: Yes  Freedom of Choice List was Provided with Basic Dialogue that Supports the Patient's Individualized Plan of Care/Goals, Treatment Preferences and Shares the Quality Data Associated with the Providers?: No  [place] Resource Information Provided?: No  Discharge Location  Discharge Placement: Home with family assistance    KRUPA Ojeda  Care Manager, [address] [place] [address]

## 2021-11-12 NOTE — DISCHARGE INSTRUCTIONS
HOSPITALIST DISCHARGE INSTRUCTIONS    NAME: Matthew De   :  1970   MRN:  [id number]     Date/Time:  2021 9:07 AM    ADMIT DATE: 11/10/2021   DISCHARGE DATE: 2021     alcohol withdrawal  anxiety   Hypokalemia, hypomagnesemia  Hypertension  ? EtOH abuse  Anxiety/depression    · It is important that you take the medication exactly as they are prescribed. · Keep your medication in the bottles provided by the pharmacist and keep a list of the medication names, dosages, and times to be taken in your wallet. · Do not take other medications without consulting your doctor. What to do at [address]:  Resume previous diet    Recommended activity: Activity as tolerated      If you have questions regarding the hospital related prescriptions or hospital related issues please call [facility] at [phone]. You can always direct your questions to your primary care doctor if you are unable to reach your hospital physician; your PCP works as an extension of your hospital doctor just like your hospital doctor is an extension of your PCP for your time at the [facility])    If you experience any of the following symptoms then please call your primary care physician or return to the emergency room if you cannot get hold of your doctor:    Fever, chills, nausea, vomiting, or persistent diarrhea  Worsening weakness or new problems with your speech or balance  Dark stools or visible blood in your stools  New Leg swelling or shortness of breath as these could be signs of a clot    Additional Instructions:      Bring these papers with you to your follow up appointments. The papers will help your doctors be sure to continue the care plan from the hospital.              Information obtained by :  I understand that if any problems occur once I am at home I am to contact my physician. I understand and acknowledge receipt of the instructions indicated above. Physician's or R.N.'s Signature                                                                  Date/Time                                                                                                                                              Patient or Representative Signature

## 2021-11-12 NOTE — DISCHARGE SUMMARY
Hospitalist Discharge Summary     Patient ID:  Adore Feliciano  [id number]  68 y.o.  1970  11/10/2021    PCP on record: Nguyen Colmenares MD    Admit date: 11/10/2021  Discharge date and time: 11/12/2021    DISCHARGE DIAGNOSIS:    alcohol withdrawal  anxiety   Hypokalemia, hypomagnesemia  Hypertension  ? EtOH abuse  Anxiety/depression    CONSULTATIONS:  None    Excerpted HPI from H&P of Giselle Ruano MD:  Adore Feliciano is a 46 y.o.  female with past medical history of hypertension and anxiety who presented with complaint of generalized weakness, dyspnea, diaphoresis that has been going on for the last couple of days. Patient is transferred from [address] [place] [address] emergency at [facility]. Symptoms srated 3 days ago on Monday night when she had an episode of vomiting/ She woke up the next day feeling quesy. She went to the garage to grab gingerale and on her way back she suddenly started feeling shaky, sweating profusely, and her legs were \"weak like noodles\". She had difficulty breathing and was thought she was going to pass out. She was panting when she gets back to her room. She sat down, started feeling better about 20 minutes later. She denies chest pain or palpitation. She experienced similar symptoms this morning when she was walking from the kitchen to her bed room prompting her to come to the ED. patient has history of anxiety but denies recent major stressor or history of panic attacks. She was notably shaking and anxious at the time of my evaluation. She drinks alcohol 1 to 2 glasses of wine up to 4 times per week and drinks vodka occasionally. Denies history of alcohol withdrawal symptoms. She denies self or family history of MI  Her last menses was 3 months ago.    ______________________________________________________________________  DISCHARGE SUMMARY/HOSPITAL COURSE:  for full details see H&P, daily progress notes, labs, consult notes.      Exertional dyspnea, diaphoresis, shortness of breath  Suspect alcohol withdrawal  -Presented with above symptoms x 2-3 days  -Symptoms likely multifactorial including alcohol withdrawal.  She had significant hypomagnesemia and hypokalemia which is consistent with alcohol abuse. -EKG/troponin negative. CXR negative. -Vaccinated for COVID-19. Rapid Covid and influenza test negative     Her D-dimer was negative. Troponin was negative. Echocardiogram was unremarkable  She was started on Librium 10 mg scheduled, which had increased her anxiety  She will be given as needed lorazepam to take home. She was strongly advised to quit alcohol     Electrolyte imbalance: Hypokalemia, hypomagnesemia, suspect from alcohol  -Potassium 3 and mag 1.1 at outside ED  -Likely secondary to HCTZ/alcohol  Her electrolytes normalized     Hypertension  -Continue home medications     EtOH abuse  -P.o. thiamine  -Librium 10 mg scheduled, continue CIWA protocol     Anxiety/depression  -Continue Lexapro. She has been inconsistently using her meds. Strongly advised to continue Lexapro, and advised to discuss with the PCP before stopping any medication           _______________________________________________________________________  Patient seen and examined by me on discharge day. Pertinent Findings:  Gen:    Not in distress  Chest: Clear lungs  CVS:   Regular rhythm. No edema  Abd:  Soft, not distended, not tender  Neuro:  Alert,   _______________________________________________________________________  DISCHARGE MEDICATIONS:   Discharge Medication List as of 11/12/2021  9:25 AM      START taking these medications    Details   thiamine mononitrate (B-1) 100 mg tablet Take 1 Tablet by mouth daily. , Normal, Disp-30 Tablet, R-0      LORazepam (ATIVAN) 1 mg tablet Take 1 Tablet by mouth every eight (8) hours as needed for Anxiety (Alcohol withdrawl/tremors) for up to 3 days.  Max Daily Amount: 3 mg., Normal, Disp-6 Tablet, R-0      escitalopram oxalate (LEXAPRO) 10 mg tablet Take 1 Tablet by mouth daily. , Normal, Disp-30 Tablet, R-0         CONTINUE these medications which have CHANGED    Details   amLODIPine (NORVASC) 10 mg tablet Take 1 Tablet by mouth daily. , Normal, Disp-30 Tablet, R-1      lisinopriL (PRINIVIL, ZESTRIL) 40 mg tablet Take 1 Tablet by mouth daily. , Normal, Disp-30 Tablet, R-1      hydroCHLOROthiazide (HYDRODIURIL) 25 mg tablet Take 1 Tablet by mouth daily. , Normal, Disp-30 Tablet, R-0      potassium chloride (K-DUR, KLOR-CON) 20 mEq tablet Take 1 Tablet by mouth daily. , Normal, Disp-15 Tablet, R-0               Patient Follow Up Instructions: Activity: Activity as tolerated    Follow-up Information     Follow up With Specialties Details Why Contact Info    August Garcia MD Family Medicine Go on 11/19/2021 at 10:30 AM for new-patient PCP appointment.  Please arrive by 10:15 AM and call office upon arrival. [address]  [address] [facility] Thais Bonner MD Addiction Medicine   . [address]  [address] [facility],[address] [place] [phone]          ________________________________________________________________    Risk of deterioration: Low    Condition at Discharge:  Stable  __________________________________________________________________    Disposition  Home with family, no needs    ____________________________________________________________________    Code Status: Full Code  ___________________________________________________________________      Total time in minutes spent coordinating this discharge (includes going over instructions, follow-up, prescriptions, and preparing report for sign off to her PCP) :  >30 minutes    Signed:  Cynthia Johnson MD

## 2021-11-12 NOTE — PROGRESS NOTES
Pt given d/c instructions and voices understanding of continued care plan, follow up appts and medications changes. Pt released to mothers care clinically stable.

## 2021-11-12 NOTE — PROGRESS NOTES
.  Katerina Ortega. November 12, 2021       RE: Mini Lakhani      To Whom It May Concern,    This is to certify that Mini Lakhani may return to work on Nov 16,2021. Please feel free to contact my office if you have any questions or concerns. Thank you for your assistance in this matter.       Sincerely,  Terese Mariano MD

## 2022-03-19 PROBLEM — R06.00 DYSPNEA: Status: ACTIVE | Noted: 2021-11-10

## 2022-08-10 ENCOUNTER — TRANSCRIBE ORDER (OUTPATIENT)
Dept: SCHEDULING | Age: 52
End: 2022-08-10

## 2022-08-10 DIAGNOSIS — Z12.31 VISIT FOR SCREENING MAMMOGRAM: Primary | ICD-10-CM

## 2022-09-07 ENCOUNTER — HOSPITAL ENCOUNTER (OUTPATIENT)
Dept: MAMMOGRAPHY | Age: 52
Discharge: HOME | End: 2022-09-07
Attending: NURSE PRACTITIONER
Payer: MEDICAID

## 2022-09-07 DIAGNOSIS — Z12.31 VISIT FOR SCREENING MAMMOGRAM: ICD-10-CM

## 2022-09-07 PROCEDURE — 77067 SCR MAMMO BI INCL CAD: CPT

## 2022-10-26 ENCOUNTER — HOSPITAL ENCOUNTER (OUTPATIENT)
Dept: MAMMOGRAPHY | Age: 52
Discharge: HOME | End: 2022-10-26
Attending: NURSE PRACTITIONER

## 2022-10-26 ENCOUNTER — HOSPITAL ENCOUNTER (OUTPATIENT)
Dept: ULTRASOUND IMAGING | Age: 52
Discharge: HOME | End: 2022-10-26
Attending: NURSE PRACTITIONER

## 2022-10-26 DIAGNOSIS — R92.8 ABNORMAL MAMMOGRAM OF LEFT BREAST: ICD-10-CM

## 2022-10-26 DIAGNOSIS — N64.89 BREAST ASYMMETRY: ICD-10-CM

## 2022-11-02 ENCOUNTER — TRANSCRIBE ORDER (OUTPATIENT)
Dept: SCHEDULING | Age: 52
End: 2022-11-02

## 2022-11-02 DIAGNOSIS — R92.8 ABNORMAL MAMMOGRAM: Primary | ICD-10-CM

## 2022-12-29 ENCOUNTER — HOSPITAL ENCOUNTER (OUTPATIENT)
Dept: MAMMOGRAPHY | Age: 52
Discharge: HOME | End: 2022-12-29
Attending: NURSE PRACTITIONER
Payer: MEDICAID

## 2022-12-29 DIAGNOSIS — R92.8 ABNORMAL MAMMOGRAM: ICD-10-CM

## 2022-12-29 DIAGNOSIS — R92.8 ABNORMAL MAMMOGRAM OF RIGHT BREAST: ICD-10-CM

## 2022-12-29 DIAGNOSIS — N64.89 BREAST ASYMMETRY: ICD-10-CM

## 2022-12-29 PROCEDURE — 77061 BREAST TOMOSYNTHESIS UNI: CPT

## 2023-04-07 ENCOUNTER — HOSPITAL ENCOUNTER (OUTPATIENT)
Age: 53
End: 2023-04-07
Attending: STUDENT IN AN ORGANIZED HEALTH CARE EDUCATION/TRAINING PROGRAM
Payer: MEDICAID

## 2023-09-15 ENCOUNTER — HOSPITAL ENCOUNTER (OUTPATIENT)
Facility: HOSPITAL | Age: 53
End: 2023-09-15
Payer: MEDICAID

## 2023-09-15 VITALS — HEIGHT: 65 IN | BODY MASS INDEX: 24.16 KG/M2 | WEIGHT: 145 LBS

## 2023-09-15 DIAGNOSIS — Z12.31 VISIT FOR SCREENING MAMMOGRAM: ICD-10-CM

## 2023-09-15 PROCEDURE — 77063 BREAST TOMOSYNTHESIS BI: CPT

## 2025-05-05 ENCOUNTER — APPOINTMENT (OUTPATIENT)
Facility: HOSPITAL | Age: 55
DRG: 204 | End: 2025-05-05
Payer: MEDICAID

## 2025-05-05 ENCOUNTER — HOSPITAL ENCOUNTER (INPATIENT)
Facility: HOSPITAL | Age: 55
LOS: 2 days | Discharge: HOME | DRG: 204 | End: 2025-05-07
Attending: EMERGENCY MEDICINE | Admitting: HOSPITALIST
Payer: MEDICAID

## 2025-05-05 DIAGNOSIS — R51.9 ACUTE NONINTRACTABLE HEADACHE, UNSPECIFIED HEADACHE TYPE: ICD-10-CM

## 2025-05-05 DIAGNOSIS — R42 DIZZINESS: ICD-10-CM

## 2025-05-05 DIAGNOSIS — R06.00 DYSPNEA: Primary | ICD-10-CM

## 2025-05-05 PROBLEM — R55 SYNCOPE AND COLLAPSE: Status: ACTIVE | Noted: 2025-05-05

## 2025-05-05 LAB
ALBUMIN SERPL-MCNC: 3.3 G/DL (ref 3.5–5)
ALBUMIN/GLOB SERPL: 0.9 (ref 1.1–2.2)
ALP SERPL-CCNC: 105 U/L (ref 45–117)
ALT SERPL-CCNC: 30 U/L (ref 12–78)
ANION GAP SERPL CALC-SCNC: 11 MMOL/L (ref 2–12)
APPEARANCE UR: CLEAR
AST SERPL-CCNC: 58 U/L (ref 15–37)
BACTERIA URNS QL MICRO: NEGATIVE /HPF
BASOPHILS # BLD: 0.03 K/UL (ref 0–0.1)
BASOPHILS NFR BLD: 0.2 % (ref 0–1)
BILIRUB SERPL-MCNC: 0.5 MG/DL (ref 0.2–1)
BILIRUB UR QL: NEGATIVE
BUN SERPL-MCNC: 6 MG/DL (ref 6–20)
BUN/CREAT SERPL: 6 (ref 12–20)
CALCIUM SERPL-MCNC: 9.2 MG/DL (ref 8.5–10.1)
CHLORIDE SERPL-SCNC: 92 MMOL/L (ref 97–108)
CO2 SERPL-SCNC: 28 MMOL/L (ref 21–32)
COLOR UR: NORMAL
CREAT SERPL-MCNC: 0.97 MG/DL (ref 0.55–1.02)
DIFFERENTIAL METHOD BLD: ABNORMAL
EOSINOPHIL # BLD: 0.12 K/UL (ref 0–0.4)
EOSINOPHIL NFR BLD: 0.9 % (ref 0–7)
EPITH CASTS URNS QL MICRO: NORMAL /LPF
ERYTHROCYTE [DISTWIDTH] IN BLOOD BY AUTOMATED COUNT: 15.2 % (ref 11.5–14.5)
ETHANOL SERPL-MCNC: 51 MG/DL (ref 0–0.08)
GLOBULIN SER CALC-MCNC: 3.8 G/DL (ref 2–4)
GLUCOSE BLD STRIP.AUTO-MCNC: 122 MG/DL (ref 65–117)
GLUCOSE SERPL-MCNC: 98 MG/DL (ref 65–100)
GLUCOSE UR STRIP.AUTO-MCNC: NEGATIVE MG/DL
HCT VFR BLD AUTO: 28.2 % (ref 35–47)
HGB BLD-MCNC: 9.6 G/DL (ref 11.5–16)
HGB UR QL STRIP: NEGATIVE
IMM GRANULOCYTES # BLD AUTO: 0.23 K/UL (ref 0–0.04)
IMM GRANULOCYTES NFR BLD AUTO: 1.8 % (ref 0–0.5)
KETONES UR QL STRIP.AUTO: NEGATIVE MG/DL
LEUKOCYTE ESTERASE UR QL STRIP.AUTO: NEGATIVE
LYMPHOCYTES # BLD: 2.54 K/UL (ref 0.8–3.5)
LYMPHOCYTES NFR BLD: 19.5 % (ref 12–49)
MAGNESIUM SERPL-MCNC: 1.2 MG/DL (ref 1.6–2.4)
MCH RBC QN AUTO: 36.1 PG (ref 26–34)
MCHC RBC AUTO-ENTMCNC: 34 G/DL (ref 30–36.5)
MCV RBC AUTO: 106 FL (ref 80–99)
MONOCYTES # BLD: 0.75 K/UL (ref 0–1)
MONOCYTES NFR BLD: 5.8 % (ref 5–13)
NEUTS SEG # BLD: 9.37 K/UL (ref 1.8–8)
NEUTS SEG NFR BLD: 71.8 % (ref 32–75)
NITRITE UR QL STRIP.AUTO: NEGATIVE
NRBC # BLD: 0 K/UL (ref 0–0.01)
NRBC BLD-RTO: 0 PER 100 WBC
NT PRO BNP: 95 PG/ML (ref 0–125)
PH UR STRIP: 6.5 (ref 5–8)
PLATELET # BLD AUTO: 281 K/UL (ref 150–400)
PMV BLD AUTO: 8.9 FL (ref 8.9–12.9)
POTASSIUM SERPL-SCNC: 3 MMOL/L (ref 3.5–5.1)
PROT SERPL-MCNC: 7.1 G/DL (ref 6.4–8.2)
PROT UR STRIP-MCNC: NEGATIVE MG/DL
RBC # BLD AUTO: 2.66 M/UL (ref 3.8–5.2)
RBC #/AREA URNS HPF: NORMAL /HPF (ref 0–5)
SERVICE CMNT-IMP: ABNORMAL
SODIUM SERPL-SCNC: 131 MMOL/L (ref 136–145)
SP GR UR REFRACTOMETRY: <1.005
TROPONIN I SERPL HS-MCNC: <4 NG/L (ref 0–51)
URINE CULTURE IF INDICATED: NORMAL
UROBILINOGEN UR QL STRIP.AUTO: 0.2 EU/DL (ref 0.2–1)
WBC # BLD AUTO: 13 K/UL (ref 3.6–11)
WBC URNS QL MICRO: NORMAL /HPF (ref 0–4)

## 2025-05-05 PROCEDURE — 73521 X-RAY EXAM HIPS BI 2 VIEWS: CPT

## 2025-05-05 PROCEDURE — 82962 GLUCOSE BLOOD TEST: CPT

## 2025-05-05 PROCEDURE — 82728 ASSAY OF FERRITIN: CPT

## 2025-05-05 PROCEDURE — 70450 CT HEAD/BRAIN W/O DYE: CPT

## 2025-05-05 PROCEDURE — 93005 ELECTROCARDIOGRAM TRACING: CPT | Performed by: EMERGENCY MEDICINE

## 2025-05-05 PROCEDURE — 83880 ASSAY OF NATRIURETIC PEPTIDE: CPT

## 2025-05-05 PROCEDURE — 83735 ASSAY OF MAGNESIUM: CPT

## 2025-05-05 PROCEDURE — 96361 HYDRATE IV INFUSION ADD-ON: CPT

## 2025-05-05 PROCEDURE — 83540 ASSAY OF IRON: CPT

## 2025-05-05 PROCEDURE — 71045 X-RAY EXAM CHEST 1 VIEW: CPT

## 2025-05-05 PROCEDURE — 6370000000 HC RX 637 (ALT 250 FOR IP): Performed by: EMERGENCY MEDICINE

## 2025-05-05 PROCEDURE — 96375 TX/PRO/DX INJ NEW DRUG ADDON: CPT

## 2025-05-05 PROCEDURE — 83550 IRON BINDING TEST: CPT

## 2025-05-05 PROCEDURE — 36415 COLL VENOUS BLD VENIPUNCTURE: CPT

## 2025-05-05 PROCEDURE — 2580000003 HC RX 258: Performed by: EMERGENCY MEDICINE

## 2025-05-05 PROCEDURE — 6360000002 HC RX W HCPCS: Performed by: EMERGENCY MEDICINE

## 2025-05-05 PROCEDURE — 80053 COMPREHEN METABOLIC PANEL: CPT

## 2025-05-05 PROCEDURE — 85025 COMPLETE CBC W/AUTO DIFF WBC: CPT

## 2025-05-05 PROCEDURE — 1100000000 HC RM PRIVATE

## 2025-05-05 PROCEDURE — 82077 ASSAY SPEC XCP UR&BREATH IA: CPT

## 2025-05-05 PROCEDURE — 6360000002 HC RX W HCPCS: Performed by: HOSPITALIST

## 2025-05-05 PROCEDURE — 99285 EMERGENCY DEPT VISIT HI MDM: CPT

## 2025-05-05 PROCEDURE — 96365 THER/PROPH/DIAG IV INF INIT: CPT

## 2025-05-05 PROCEDURE — 81001 URINALYSIS AUTO W/SCOPE: CPT

## 2025-05-05 PROCEDURE — 84484 ASSAY OF TROPONIN QUANT: CPT

## 2025-05-05 PROCEDURE — 2500000003 HC RX 250 WO HCPCS: Performed by: HOSPITALIST

## 2025-05-05 RX ORDER — 0.9 % SODIUM CHLORIDE 0.9 %
1000 INTRAVENOUS SOLUTION INTRAVENOUS ONCE
Status: COMPLETED | OUTPATIENT
Start: 2025-05-05 | End: 2025-05-05

## 2025-05-05 RX ORDER — KETOROLAC TROMETHAMINE 30 MG/ML
30 INJECTION, SOLUTION INTRAMUSCULAR; INTRAVENOUS
Status: COMPLETED | OUTPATIENT
Start: 2025-05-05 | End: 2025-05-05

## 2025-05-05 RX ORDER — ONDANSETRON 4 MG/1
4 TABLET, ORALLY DISINTEGRATING ORAL EVERY 8 HOURS PRN
Status: DISCONTINUED | OUTPATIENT
Start: 2025-05-05 | End: 2025-05-07 | Stop reason: HOSPADM

## 2025-05-05 RX ORDER — THIAMINE MONONITRATE (VIT B1) 100 MG
100 TABLET ORAL DAILY
Status: ON HOLD | COMMUNITY
End: 2025-05-07 | Stop reason: HOSPADM

## 2025-05-05 RX ORDER — ENOXAPARIN SODIUM 100 MG/ML
40 INJECTION SUBCUTANEOUS DAILY
Status: DISCONTINUED | OUTPATIENT
Start: 2025-05-05 | End: 2025-05-07 | Stop reason: HOSPADM

## 2025-05-05 RX ORDER — ESCITALOPRAM OXALATE 20 MG/1
20 TABLET ORAL DAILY
Status: ON HOLD | COMMUNITY
End: 2025-05-07

## 2025-05-05 RX ORDER — ASPIRIN 300 MG/1
300 SUPPOSITORY RECTAL DAILY
Status: DISCONTINUED | OUTPATIENT
Start: 2025-05-05 | End: 2025-05-05

## 2025-05-05 RX ORDER — SODIUM CHLORIDE 0.9 % (FLUSH) 0.9 %
5-40 SYRINGE (ML) INJECTION PRN
Status: DISCONTINUED | OUTPATIENT
Start: 2025-05-05 | End: 2025-05-07 | Stop reason: HOSPADM

## 2025-05-05 RX ORDER — SODIUM CHLORIDE 9 MG/ML
INJECTION, SOLUTION INTRAVENOUS PRN
Status: DISCONTINUED | OUTPATIENT
Start: 2025-05-05 | End: 2025-05-07 | Stop reason: HOSPADM

## 2025-05-05 RX ORDER — SODIUM CHLORIDE 0.9 % (FLUSH) 0.9 %
5-40 SYRINGE (ML) INJECTION EVERY 12 HOURS SCHEDULED
Status: DISCONTINUED | OUTPATIENT
Start: 2025-05-05 | End: 2025-05-07 | Stop reason: HOSPADM

## 2025-05-05 RX ORDER — TRAZODONE HYDROCHLORIDE 100 MG/1
50-100 TABLET ORAL NIGHTLY PRN
Status: ON HOLD | COMMUNITY
End: 2025-05-07 | Stop reason: HOSPADM

## 2025-05-05 RX ORDER — ONDANSETRON 2 MG/ML
4 INJECTION INTRAMUSCULAR; INTRAVENOUS EVERY 6 HOURS PRN
Status: DISCONTINUED | OUTPATIENT
Start: 2025-05-05 | End: 2025-05-07 | Stop reason: HOSPADM

## 2025-05-05 RX ORDER — MAGNESIUM SULFATE IN WATER 40 MG/ML
2000 INJECTION, SOLUTION INTRAVENOUS
Status: COMPLETED | OUTPATIENT
Start: 2025-05-05 | End: 2025-05-05

## 2025-05-05 RX ORDER — POLYETHYLENE GLYCOL 3350 17 G/17G
17 POWDER, FOR SOLUTION ORAL DAILY PRN
Status: DISCONTINUED | OUTPATIENT
Start: 2025-05-05 | End: 2025-05-07 | Stop reason: HOSPADM

## 2025-05-05 RX ORDER — POTASSIUM CHLORIDE 750 MG/1
40 TABLET, EXTENDED RELEASE ORAL ONCE
Status: COMPLETED | OUTPATIENT
Start: 2025-05-05 | End: 2025-05-05

## 2025-05-05 RX ORDER — ASPIRIN 81 MG/1
81 TABLET, CHEWABLE ORAL DAILY
Status: DISCONTINUED | OUTPATIENT
Start: 2025-05-05 | End: 2025-05-05

## 2025-05-05 RX ADMIN — MAGNESIUM SULFATE HEPTAHYDRATE 2000 MG: 40 INJECTION, SOLUTION INTRAVENOUS at 19:07

## 2025-05-05 RX ADMIN — SODIUM CHLORIDE 1000 ML: 0.9 INJECTION, SOLUTION INTRAVENOUS at 16:45

## 2025-05-05 RX ADMIN — POTASSIUM CHLORIDE 40 MEQ: 750 TABLET, FILM COATED, EXTENDED RELEASE ORAL at 17:52

## 2025-05-05 RX ADMIN — ENOXAPARIN SODIUM 40 MG: 100 INJECTION SUBCUTANEOUS at 21:22

## 2025-05-05 RX ADMIN — SODIUM CHLORIDE, PRESERVATIVE FREE 10 ML: 5 INJECTION INTRAVENOUS at 21:52

## 2025-05-05 RX ADMIN — KETOROLAC TROMETHAMINE 30 MG: 30 INJECTION, SOLUTION INTRAMUSCULAR at 17:51

## 2025-05-05 ASSESSMENT — PAIN SCALES - GENERAL
PAINLEVEL_OUTOF10: 0
PAINLEVEL_OUTOF10: 0

## 2025-05-05 ASSESSMENT — PAIN - FUNCTIONAL ASSESSMENT: PAIN_FUNCTIONAL_ASSESSMENT: 0-10

## 2025-05-05 NOTE — ED NOTES
Emergency Department Nursing Plan of Care       The Nursing Plan of Care is developed from the Nursing assessment and Emergency Department Attending provider initial evaluation.  The plan of care may be reviewed in the “ED Provider note”.      The Plan of Care was developed with the following considerations:  Patient / Family readiness to learn indicated by:verbalized understanding  Persons(s) to be included in education: patient  Barriers to Learning/Limitations:None      Signed     RABIA MICHAEL RN    5/5/2025   5:31 PM

## 2025-05-05 NOTE — ED PROVIDER NOTES
[facility] EMERGENCY DEPARTMENT  EMERGENCY DEPARTMENT ENCOUNTER       Pt Name: Katie Rico  MRN: [id number]  Birthdate 1970  Date of evaluation: 5/5/2025  Provider: Randal Hill MD   PCP: Lorenza Ocampo APRN - NP  Note Started: 8:23 PM 5/5/25     CHIEF COMPLAINT       Chief Complaint   Patient presents with   • Dizziness   • Headache        HISTORY OF PRESENT ILLNESS: 1 or more elements      History From: Patient, History limited by: None     Katie Rico is a 55 y.o. female with a history of hypertension, alcohol use, anemia, depression who presents with lightheadedness.  Patient describes lightheadedness after urinating about an hour ago.  She felt lightheaded after she stood up, fell to the ground.  She reports she has had generalized weakness since then, feels very anxious.  Blood pressure elevated and route, glucose 125 and route otherwise vital signs unremarkable.  Reports feeling \"off\" today however cannot specify exactly.  No changes to her vision.  She feels no lightheadedness or dizziness at rest.    She reports multiple similar episodes in the past.  She does still take her thiazide, does not currently take sodium supplementation.  Endorses ongoing alcohol use.    Patient also endorses chronic bilateral hip pain, worse with walking has been going on for some time.     Nursing Notes were all reviewed and agreed with or any disagreements were addressed in the HPI.     REVIEW OF SYSTEMS        Positives and Pertinent negatives as per HPI.    PAST HISTORY     Past Medical History:  Past Medical History:   Diagnosis Date   • Anxiety    • Depression    • Hypertension    • Ill-defined condition     ETOH abuse       Past Surgical History:  History reviewed. No pertinent surgical history.    Family History:  Family History   Problem Relation Age of Onset   • Hypertension Sister    • Hypertension Sister    • Elevated Lipids Brother    • Hypertension Brother    • Elevated Lipids Father    •

## 2025-05-05 NOTE — ED NOTES
Patient coming to ED today via EMS from [facility] where she is currently residing.  Reports dizziness and near syncope starting about 45 minutes prior to arrival.  Started while getting off toilet after urinating.  Also reports some intermittent numbness in finger tips and toes bilaterally.  No focal weakness.  Initial NIH in ED of 0.  Currently reports headache.  Is requesting something to drink which was okay with Dr. Hill other comfort measures.  Lights dimmed.  Call bell in reach.  Marks and socks provided.  Awaits imaging.

## 2025-05-06 ENCOUNTER — APPOINTMENT (OUTPATIENT)
Facility: HOSPITAL | Age: 55
DRG: 204 | End: 2025-05-06
Payer: MEDICAID

## 2025-05-06 ENCOUNTER — APPOINTMENT (OUTPATIENT)
Facility: HOSPITAL | Age: 55
DRG: 204 | End: 2025-05-06
Attending: HOSPITALIST
Payer: MEDICAID

## 2025-05-06 LAB
AMPHET UR QL SCN: NEGATIVE
ANION GAP SERPL CALC-SCNC: 7 MMOL/L (ref 2–12)
APPEARANCE UR: CLEAR
BACTERIA URNS QL MICRO: NEGATIVE /HPF
BARBITURATES UR QL SCN: NEGATIVE
BENZODIAZ UR QL: POSITIVE
BILIRUB UR QL: NEGATIVE
BUN SERPL-MCNC: 8 MG/DL (ref 6–20)
BUN/CREAT SERPL: 9 (ref 12–20)
CALCIUM SERPL-MCNC: 8.8 MG/DL (ref 8.5–10.1)
CANNABINOIDS UR QL SCN: NEGATIVE
CHLORIDE SERPL-SCNC: 96 MMOL/L (ref 97–108)
CHOLEST SERPL-MCNC: 207 MG/DL
CO2 SERPL-SCNC: 27 MMOL/L (ref 21–32)
COCAINE UR QL SCN: NEGATIVE
COLOR UR: NORMAL
CREAT SERPL-MCNC: 0.9 MG/DL (ref 0.55–1.02)
ECHO AO ASC DIAM: 3.2 CM
ECHO AO ASCENDING AORTA INDEX: 1.82 CM/M2
ECHO AO ROOT DIAM: 3.1 CM
ECHO AO ROOT INDEX: 1.76 CM/M2
ECHO AV AREA PEAK VELOCITY: 2 CM2
ECHO AV AREA VTI: 1.9 CM2
ECHO AV AREA/BSA PEAK VELOCITY: 1.1 CM2/M2
ECHO AV AREA/BSA VTI: 1.1 CM2/M2
ECHO AV MEAN GRADIENT: 3 MMHG
ECHO AV MEAN VELOCITY: 0.8 M/S
ECHO AV PEAK GRADIENT: 5 MMHG
ECHO AV PEAK VELOCITY: 1.2 M/S
ECHO AV VELOCITY RATIO: 0.67
ECHO AV VTI: 21.7 CM
ECHO BSA: 1.78 M2
ECHO EST RA PRESSURE: 3 MMHG
ECHO LA DIAMETER INDEX: 1.53 CM/M2
ECHO LA DIAMETER: 2.7 CM
ECHO LA TO AORTIC ROOT RATIO: 0.87
ECHO LA VOL A-L A2C: 57 ML (ref 22–52)
ECHO LA VOL A-L A4C: 40 ML (ref 22–52)
ECHO LA VOL BP: 49 ML (ref 22–52)
ECHO LA VOL MOD A2C: 52 ML (ref 22–52)
ECHO LA VOL MOD A4C: 38 ML (ref 22–52)
ECHO LA VOL/BSA BIPLANE: 28 ML/M2 (ref 16–34)
ECHO LA VOLUME AREA LENGTH: 53 ML
ECHO LA VOLUME INDEX A-L A2C: 32 ML/M2 (ref 16–34)
ECHO LA VOLUME INDEX A-L A4C: 23 ML/M2 (ref 16–34)
ECHO LA VOLUME INDEX AREA LENGTH: 30 ML/M2 (ref 16–34)
ECHO LA VOLUME INDEX MOD A2C: 30 ML/M2 (ref 16–34)
ECHO LA VOLUME INDEX MOD A4C: 22 ML/M2 (ref 16–34)
ECHO LV EDV A2C: 55 ML
ECHO LV EDV A4C: 67 ML
ECHO LV EDV BP: 61 ML (ref 56–104)
ECHO LV EDV INDEX A4C: 38 ML/M2
ECHO LV EDV INDEX BP: 35 ML/M2
ECHO LV EDV NDEX A2C: 31 ML/M2
ECHO LV EF PHYSICIAN: 60 %
ECHO LV EJECTION FRACTION A2C: 52 %
ECHO LV EJECTION FRACTION A4C: 66 %
ECHO LV EJECTION FRACTION BIPLANE: 60 % (ref 55–100)
ECHO LV ESV A2C: 26 ML
ECHO LV ESV A4C: 23 ML
ECHO LV ESV BP: 25 ML (ref 19–49)
ECHO LV ESV INDEX A2C: 15 ML/M2
ECHO LV ESV INDEX A4C: 13 ML/M2
ECHO LV ESV INDEX BP: 14 ML/M2
ECHO LV FRACTIONAL SHORTENING: 27 % (ref 28–44)
ECHO LV INTERNAL DIMENSION DIASTOLE INDEX: 1.88 CM/M2
ECHO LV INTERNAL DIMENSION DIASTOLIC: 3.3 CM (ref 3.9–5.3)
ECHO LV INTERNAL DIMENSION SYSTOLIC INDEX: 1.36 CM/M2
ECHO LV INTERNAL DIMENSION SYSTOLIC: 2.4 CM
ECHO LV IVSD: 0.9 CM (ref 0.6–0.9)
ECHO LV MASS 2D: 87.7 G (ref 67–162)
ECHO LV MASS INDEX 2D: 49.8 G/M2 (ref 43–95)
ECHO LV POSTERIOR WALL DIASTOLIC: 1 CM (ref 0.6–0.9)
ECHO LV RELATIVE WALL THICKNESS RATIO: 0.61
ECHO LVOT AREA: 2.8 CM2
ECHO LVOT AV VTI INDEX: 0.71
ECHO LVOT DIAM: 1.9 CM
ECHO LVOT MEAN GRADIENT: 2 MMHG
ECHO LVOT PEAK GRADIENT: 3 MMHG
ECHO LVOT PEAK VELOCITY: 0.8 M/S
ECHO LVOT STROKE VOLUME INDEX: 24.6 ML/M2
ECHO LVOT SV: 43.4 ML
ECHO LVOT VTI: 15.3 CM
ECHO MV A VELOCITY: 0.64 M/S
ECHO MV AREA PHT: 11.6 CM2
ECHO MV E DECELERATION TIME (DT): 65.7 MS
ECHO MV E VELOCITY: 0.69 M/S
ECHO MV E/A RATIO: 1.08
ECHO MV PRESSURE HALF TIME (PHT): 19 MS
ECHO RV INTERNAL DIMENSION: 3.2 CM
EPITH CASTS URNS QL MICRO: NORMAL /LPF
ERYTHROCYTE [DISTWIDTH] IN BLOOD BY AUTOMATED COUNT: 15.4 % (ref 11.5–14.5)
EST. AVERAGE GLUCOSE BLD GHB EST-MCNC: 82 MG/DL
FERRITIN SERPL-MCNC: 267 NG/ML (ref 26–388)
FOLATE SERPL-MCNC: 2.2 NG/ML (ref 5–21)
GLUCOSE SERPL-MCNC: 101 MG/DL (ref 65–100)
GLUCOSE UR STRIP.AUTO-MCNC: NEGATIVE MG/DL
HBA1C MFR BLD: 4.5 % (ref 4–5.6)
HCT VFR BLD AUTO: 27.1 % (ref 35–47)
HDLC SERPL-MCNC: 66 MG/DL
HDLC SERPL: 3.1 (ref 0–5)
HGB BLD-MCNC: 9 G/DL (ref 11.5–16)
HGB UR QL STRIP: NEGATIVE
IRON SATN MFR SERPL: 10 % (ref 20–50)
IRON SERPL-MCNC: 32 UG/DL (ref 35–150)
KETONES UR QL STRIP.AUTO: NEGATIVE MG/DL
LDLC SERPL CALC-MCNC: 111.4 MG/DL (ref 0–100)
LEUKOCYTE ESTERASE UR QL STRIP.AUTO: NEGATIVE
Lab: ABNORMAL
MCH RBC QN AUTO: 35.4 PG (ref 26–34)
MCHC RBC AUTO-ENTMCNC: 33.2 G/DL (ref 30–36.5)
MCV RBC AUTO: 106.7 FL (ref 80–99)
METHADONE UR QL: NEGATIVE
NITRITE UR QL STRIP.AUTO: NEGATIVE
NRBC # BLD: 0 K/UL (ref 0–0.01)
NRBC BLD-RTO: 0 PER 100 WBC
OPIATES UR QL: NEGATIVE
PCP UR QL: NEGATIVE
PH UR STRIP: 6.5 (ref 5–8)
PLATELET # BLD AUTO: 274 K/UL (ref 150–400)
PMV BLD AUTO: 9.3 FL (ref 8.9–12.9)
POTASSIUM SERPL-SCNC: 3.9 MMOL/L (ref 3.5–5.1)
PROT UR STRIP-MCNC: NEGATIVE MG/DL
RBC # BLD AUTO: 2.54 M/UL (ref 3.8–5.2)
RBC #/AREA URNS HPF: NORMAL /HPF (ref 0–5)
SODIUM SERPL-SCNC: 130 MMOL/L (ref 136–145)
SODIUM UR-SCNC: 40 MMOL/L
SP GR UR REFRACTOMETRY: 1.01
T4 FREE SERPL-MCNC: 1.3 NG/DL (ref 0.8–1.5)
TIBC SERPL-MCNC: 316 UG/DL (ref 250–450)
TRIGL SERPL-MCNC: 148 MG/DL
TROPONIN I SERPL HS-MCNC: <4 NG/L (ref 0–51)
TSH SERPL DL<=0.05 MIU/L-ACNC: 1.45 UIU/ML (ref 0.36–3.74)
URINE CULTURE IF INDICATED: NORMAL
UROBILINOGEN UR QL STRIP.AUTO: 1 EU/DL (ref 0.2–1)
VIT B12 SERPL-MCNC: 607 PG/ML (ref 193–986)
VLDLC SERPL CALC-MCNC: 29.6 MG/DL
WBC # BLD AUTO: 10.8 K/UL (ref 3.6–11)
WBC URNS QL MICRO: NORMAL /HPF (ref 0–4)

## 2025-05-06 PROCEDURE — 6370000000 HC RX 637 (ALT 250 FOR IP): Performed by: HOSPITALIST

## 2025-05-06 PROCEDURE — 84484 ASSAY OF TROPONIN QUANT: CPT

## 2025-05-06 PROCEDURE — 82746 ASSAY OF FOLIC ACID SERUM: CPT

## 2025-05-06 PROCEDURE — 2500000003 HC RX 250 WO HCPCS: Performed by: HOSPITALIST

## 2025-05-06 PROCEDURE — 6360000002 HC RX W HCPCS: Performed by: HOSPITALIST

## 2025-05-06 PROCEDURE — 80061 LIPID PANEL: CPT

## 2025-05-06 PROCEDURE — 84300 ASSAY OF URINE SODIUM: CPT

## 2025-05-06 PROCEDURE — 81001 URINALYSIS AUTO W/SCOPE: CPT

## 2025-05-06 PROCEDURE — 6370000000 HC RX 637 (ALT 250 FOR IP): Performed by: INTERNAL MEDICINE

## 2025-05-06 PROCEDURE — 85027 COMPLETE CBC AUTOMATED: CPT

## 2025-05-06 PROCEDURE — 97165 OT EVAL LOW COMPLEX 30 MIN: CPT | Performed by: OCCUPATIONAL THERAPIST

## 2025-05-06 PROCEDURE — 36415 COLL VENOUS BLD VENIPUNCTURE: CPT

## 2025-05-06 PROCEDURE — 93306 TTE W/DOPPLER COMPLETE: CPT | Performed by: SPECIALIST

## 2025-05-06 PROCEDURE — 83935 ASSAY OF URINE OSMOLALITY: CPT

## 2025-05-06 PROCEDURE — 93306 TTE W/DOPPLER COMPLETE: CPT

## 2025-05-06 PROCEDURE — 83036 HEMOGLOBIN GLYCOSYLATED A1C: CPT

## 2025-05-06 PROCEDURE — 82607 VITAMIN B-12: CPT

## 2025-05-06 PROCEDURE — 80307 DRUG TEST PRSMV CHEM ANLYZR: CPT

## 2025-05-06 PROCEDURE — 70551 MRI BRAIN STEM W/O DYE: CPT

## 2025-05-06 PROCEDURE — 1100000000 HC RM PRIVATE

## 2025-05-06 PROCEDURE — 97161 PT EVAL LOW COMPLEX 20 MIN: CPT | Performed by: PHYSICAL THERAPIST

## 2025-05-06 PROCEDURE — 97535 SELF CARE MNGMENT TRAINING: CPT | Performed by: OCCUPATIONAL THERAPIST

## 2025-05-06 PROCEDURE — 84443 ASSAY THYROID STIM HORMONE: CPT

## 2025-05-06 PROCEDURE — 84439 ASSAY OF FREE THYROXINE: CPT

## 2025-05-06 RX ORDER — LISINOPRIL 20 MG/1
40 TABLET ORAL DAILY
Status: DISCONTINUED | OUTPATIENT
Start: 2025-05-06 | End: 2025-05-07 | Stop reason: HOSPADM

## 2025-05-06 RX ORDER — ACETAMINOPHEN 500 MG
1000 TABLET ORAL EVERY 6 HOURS PRN
Status: ON HOLD | COMMUNITY
End: 2025-05-07 | Stop reason: HOSPADM

## 2025-05-06 RX ORDER — LANOLIN ALCOHOL/MO/W.PET/CERES
100 CREAM (GRAM) TOPICAL DAILY
Status: DISCONTINUED | OUTPATIENT
Start: 2025-05-07 | End: 2025-05-07 | Stop reason: HOSPADM

## 2025-05-06 RX ORDER — LORAZEPAM 1 MG/1
3 TABLET ORAL
Status: DISCONTINUED | OUTPATIENT
Start: 2025-05-06 | End: 2025-05-07 | Stop reason: HOSPADM

## 2025-05-06 RX ORDER — LORAZEPAM 1 MG/1
4 TABLET ORAL
Status: DISCONTINUED | OUTPATIENT
Start: 2025-05-06 | End: 2025-05-07 | Stop reason: HOSPADM

## 2025-05-06 RX ORDER — LORAZEPAM 2 MG/ML
4 INJECTION INTRAMUSCULAR
Status: DISCONTINUED | OUTPATIENT
Start: 2025-05-06 | End: 2025-05-07 | Stop reason: HOSPADM

## 2025-05-06 RX ORDER — AMLODIPINE BESYLATE 5 MG/1
10 TABLET ORAL DAILY
Status: DISCONTINUED | OUTPATIENT
Start: 2025-05-06 | End: 2025-05-07 | Stop reason: HOSPADM

## 2025-05-06 RX ORDER — AMLODIPINE BESYLATE 5 MG/1
10 TABLET ORAL DAILY
Status: DISCONTINUED | OUTPATIENT
Start: 2025-05-06 | End: 2025-05-06

## 2025-05-06 RX ORDER — FOLIC ACID 1 MG/1
1 TABLET ORAL DAILY
Status: DISCONTINUED | OUTPATIENT
Start: 2025-05-06 | End: 2025-05-07 | Stop reason: HOSPADM

## 2025-05-06 RX ORDER — ESCITALOPRAM OXALATE 10 MG/1
20 TABLET ORAL DAILY
Status: DISCONTINUED | OUTPATIENT
Start: 2025-05-06 | End: 2025-05-07 | Stop reason: HOSPADM

## 2025-05-06 RX ORDER — LORAZEPAM 2 MG/ML
2 INJECTION INTRAMUSCULAR
Status: DISCONTINUED | OUTPATIENT
Start: 2025-05-06 | End: 2025-05-07 | Stop reason: HOSPADM

## 2025-05-06 RX ORDER — MULTIVITAMIN WITH IRON
1 TABLET ORAL DAILY
Status: DISCONTINUED | OUTPATIENT
Start: 2025-05-06 | End: 2025-05-07 | Stop reason: HOSPADM

## 2025-05-06 RX ORDER — LORAZEPAM 1 MG/1
2 TABLET ORAL
Status: DISCONTINUED | OUTPATIENT
Start: 2025-05-06 | End: 2025-05-07 | Stop reason: HOSPADM

## 2025-05-06 RX ORDER — LORAZEPAM 2 MG/ML
1 INJECTION INTRAMUSCULAR
Status: DISCONTINUED | OUTPATIENT
Start: 2025-05-06 | End: 2025-05-07 | Stop reason: HOSPADM

## 2025-05-06 RX ORDER — TRAZODONE HYDROCHLORIDE 50 MG/1
50 TABLET ORAL NIGHTLY
Status: DISCONTINUED | OUTPATIENT
Start: 2025-05-06 | End: 2025-05-07 | Stop reason: HOSPADM

## 2025-05-06 RX ORDER — LANOLIN ALCOHOL/MO/W.PET/CERES
100 CREAM (GRAM) TOPICAL DAILY
Status: DISCONTINUED | OUTPATIENT
Start: 2025-05-06 | End: 2025-05-06

## 2025-05-06 RX ORDER — CLOBETASOL PROPIONATE 0.5 MG/G
CREAM TOPICAL 2 TIMES DAILY PRN
Status: ON HOLD | COMMUNITY
End: 2025-05-07 | Stop reason: HOSPADM

## 2025-05-06 RX ORDER — LORAZEPAM 2 MG/ML
3 INJECTION INTRAMUSCULAR
Status: DISCONTINUED | OUTPATIENT
Start: 2025-05-06 | End: 2025-05-07 | Stop reason: HOSPADM

## 2025-05-06 RX ORDER — HYDROXYZINE HYDROCHLORIDE 25 MG/1
50 TABLET, FILM COATED ORAL 3 TIMES DAILY PRN
Status: DISCONTINUED | OUTPATIENT
Start: 2025-05-06 | End: 2025-05-07 | Stop reason: HOSPADM

## 2025-05-06 RX ORDER — LORAZEPAM 1 MG/1
1 TABLET ORAL
Status: DISCONTINUED | OUTPATIENT
Start: 2025-05-06 | End: 2025-05-07 | Stop reason: HOSPADM

## 2025-05-06 RX ORDER — THIAMINE MONONITRATE (VIT B1) 100 MG
100 TABLET ORAL DAILY
Status: DISCONTINUED | OUTPATIENT
Start: 2025-05-06 | End: 2025-05-06 | Stop reason: SDUPTHER

## 2025-05-06 RX ADMIN — FOLIC ACID 1 MG: 1 TABLET ORAL at 11:04

## 2025-05-06 RX ADMIN — SODIUM CHLORIDE, PRESERVATIVE FREE 10 ML: 5 INJECTION INTRAVENOUS at 09:50

## 2025-05-06 RX ADMIN — THERA TABS 1 TABLET: TAB at 11:04

## 2025-05-06 RX ADMIN — TRAZODONE HYDROCHLORIDE 50 MG: 50 TABLET ORAL at 21:39

## 2025-05-06 RX ADMIN — ESCITALOPRAM OXALATE 20 MG: 10 TABLET ORAL at 16:16

## 2025-05-06 RX ADMIN — SODIUM CHLORIDE, PRESERVATIVE FREE 10 ML: 5 INJECTION INTRAVENOUS at 21:38

## 2025-05-06 RX ADMIN — ENOXAPARIN SODIUM 40 MG: 100 INJECTION SUBCUTANEOUS at 09:49

## 2025-05-06 RX ADMIN — LISINOPRIL 40 MG: 20 TABLET ORAL at 09:49

## 2025-05-06 RX ADMIN — Medication 100 MG: at 09:49

## 2025-05-06 RX ADMIN — HYDROXYZINE HYDROCHLORIDE 50 MG: 25 TABLET, FILM COATED ORAL at 14:01

## 2025-05-06 RX ADMIN — AMLODIPINE BESYLATE 10 MG: 5 TABLET ORAL at 05:52

## 2025-05-06 ASSESSMENT — PAIN SCALES - GENERAL
PAINLEVEL_OUTOF10: 0

## 2025-05-06 NOTE — PROGRESS NOTES
[facility] Pharmacy Admission Medication History    Information obtained from: Patient and medication bottles (stored in safe, witness JORDAN Rahman)  RxQuery data available1: Yes    Comments/recommendations:  Dates on medication bottles present concern for medication noncompliance  The [place] Prescription Monitoring Program () was accessed to determine fill history of any controlled medications. No controlled substances have been filled since August 2024.    Medication changes (since last review):  Added  Clobetasol  acetaminophen  Not Taking  alprazolam  Adjusted  Escitalopram to 20 mg  Trazodone to  mg     1RxQuery pharmacy benefit data reflects medications filled and processed through the patient's insurance, however                this data does NOT capture whether the medication was picked up or is currently being taken by the patient.     Patient allergies:   Allergies as of 05/05/2025 - Fully Reviewed 05/05/2025   Allergen Reaction Noted    Codeine Nausea And Vomiting 12/04/2011    Sulfa antibiotics Rash 06/15/2014     Prior to Admission Medications   Prescriptions Last Dose Informant   Biotin 5000 MCG CAPS  Other   Sig: Take 5,000 mcg by mouth daily   acetaminophen (TYLENOL) 500 MG tablet  Other   Sig: Take 2 tablets by mouth every 6 hours as needed for Pain   amLODIPine (NORVASC) 10 MG tablet  Other   Sig: Take 1 tablet by mouth daily   clobetasol (TEMOVATE) 0.05 % cream  Other   Sig: Apply topically 2 times daily as needed (skin irritation)   escitalopram (LEXAPRO) 20 MG tablet  Other   Sig: Take 1 tablet by mouth daily   hydroCHLOROthiazide (HYDRODIURIL) 25 MG tablet  Other   Sig: Take 1 tablet by mouth daily   lisinopril (PRINIVIL;ZESTRIL) 40 MG tablet  Other   Sig: Take 1 tablet by mouth daily   potassium chloride (KLOR-CON M) 20 MEQ extended release tablet  Other   Sig: Take 1 tablet by mouth daily   traZODone (DESYREL) 100 MG tablet 5/4/2025 Other   Sig: Take 0.5-1 tablets by mouth nightly as needed

## 2025-05-06 NOTE — PROGRESS NOTES
0709-Bedside report received from off going nurse JORDAN Hinojosa. Pt awake on bed.    0832- Pt off the floor for MRI.    9548- Pt home medication on security lock.

## 2025-05-06 NOTE — PLAN OF CARE
Problem: Occupational Therapy - Adult  Goal: By Discharge: Performs self-care activities at highest level of function for planned discharge setting.  See evaluation for individualized goals.  Description: FUNCTIONAL STATUS PRIOR TO ADMISSION:  Pt was independent and using her insurance transportation.  Prior Level of Assist for ADLs: Independent, Prior Level of Assist for Homemaking: Independent, Prior Level of Assist for Transfers: Independent, Active : No     HOME SUPPORT: Patient lived alone with minimal local support.    Occupational Therapy Goals:  Initiated 5/6/2025  1.  Patient will perform grooming standing at sink with Modified [place] within 7 day(s).  2.  Patient will perform lower body dressing with Modified [place] within 7 day(s).  3.  Patient will perform toilet transfers with Modified [place]  within 7 day(s).  4.  Patient will perform all aspects of toileting with Modified [place] within 7 day(s).  5.  Patient will participate in upper extremity therapeutic exercise/activities with [place] for 10 minutes within 7 day(s).    6.  Patient will utilize energy conservation techniques during functional activities with verbal and visual cues within 7 day(s).   Outcome: Progressing     OCCUPATIONAL THERAPY EVALUATION    Patient: Katie Rico (55 y.o. female)  Date: 5/6/2025  Primary Diagnosis: Syncope and collapse [R55]  Dizziness [R42]  Dyspnea [R06.00]  Acute nonintractable headache, unspecified headache type [R51.9]         Precautions: Fall Risk                  ASSESSMENT :  The patient is limited by decreased functional mobility, independence in ADLs, high-level IADLs, strength, endurance, safety awareness, balance and elevated BP following admission for syncope and fall.  Patient Vitals for the past 6 hrs:   Pulse BP Patient Position   05/06/25 0820 (!) 117 (!) 151/101 Standing   05/06/25 0817 (!) 102 (!) 159/104 Sitting   05/06/25 0815 97 (!) 145/100 Supine

## 2025-05-06 NOTE — CARE COORDINATION
Care Management Initial Assessment       RUR:9%  Readmission? No  1st IM letter given? N/A  1st  letter given: N/A    PATIENT ADMITTED WITH SYNCOPE AND COLLAPSE.    PLAN  PCP  NEURO  JUAN-ROLLING WALKER    CM MET WITH PATIENT AT BEDSIDE TO INTRODUCE SELF, EXPLAIN ROLE AND COMPLETE INITIAL ASSESSMENT.    PATIENT VERIFIED NAME AND BIRTH AND STATED THAT SHE WAS LIVING WITH HER PARENTS AND ONLY USES THEIR ADDRESS FOR A MAILING ADDRESS.  [address]   [facility] [id number]     PHONE NUMBER  [phone]     [facility] [id number] [place], [place] - [address] - P [phone] - F [phone]     TRANSPORTATION AT DISCHARGE  MEDICAID    SHE STATED SHE IS INTERESTED IN STOPPING ALCOHOL.    CM DISCUSSED INPATIENT SUBSTANCE PROGRAM AS WELL AS DELIVERING HOPE COMMUNITY STABILIZATION WITH MENTAL HEALTH SUBSTANCE COUNSELING , SOBER LIVING HOUSING AND HUMAN RESOURCES ASSISTANCE FOR JOB PREPARATION.    CM REMINDED PATIENT OF HER FREEDOM OF CHOICE AND SHE STATED SHE WOULD LIE TO TRY DELIVERING HOPE.    CM SENT REFERRAL AND REQUESTED CLINICALS TO DELIVERING HOPE.         05/06/25 1531   Service Assessment   Patient Orientation Alert and Oriented;Person;Place;Situation;Self   Cognition Alert   History Provided By Patient   Primary Caregiver Self   Accompanied By/Relationship N/A   Support Systems Friends/Neighbors;Family Members   Patient's Healthcare Decision Maker is: Patient Declined (Legal Next of Kin Remains as Decision Maker)   PCP Verified by CM Yes   Last Visit to PCP Within last year   Prior Functional Level Independent in ADLs/IADLs;Assistance with the following:;Mobility   Current Functional Level Independent in ADLs/IADLs;Assistance with the following:;Mobility   Can patient return to prior living arrangement Other (see comment)  (Currently unemployed)   Ability to make needs known: Good   Family able to assist with home care needs: No   Would you like for me to discuss

## 2025-05-06 NOTE — PROGRESS NOTES
Pt arrived to unit with ED staff via stretcher. Pt is alert and oriented x4, ambulatory with one assist due to weakness, and continent. Pt stated she was having dizzy spells and a headache. Pt is currently homeless and fell in her hotel room after urinating. Pt's NIH =0 and she does not have any deficits from falling. Pt denies pain, orthostatic bps have been elevated, provider made aware. Pt was educated on stroke and tobacco cessation. Pt's skin was assessed by JORDAN Hinojosa and JORDAN Vogt, no open areas noted, tattoos to left wrist. Belongings at bedside. Labs drawn without incident. RN rounds in place, plan of care to continue.

## 2025-05-06 NOTE — H&P
mg (has no administration in time range)   polyethylene glycol (GLYCOLAX) packet 17 g (has no administration in time range)   enoxaparin (LOVENOX) injection 40 mg (has no administration in time range)   sodium chloride 0.9 % bolus 1,000 mL (0 mLs IntraVENous Stopped 5/5/25 1802)   potassium chloride (KLOR-CON) extended release tablet 40 mEq (40 mEq Oral Given 5/5/25 1752)   ketorolac (TORADOL) injection 30 mg (30 mg IntraVENous Given 5/5/25 1751)       Old record review  as below      We were asked to admit for work up and evaluation of the above problems.     Past Medical History:   Diagnosis Date    Anxiety     Depression     Hypertension     Ill-defined condition     ETOH abuse        History reviewed. No pertinent surgical history.    Social History     Tobacco Use    Smoking status: Never    Smokeless tobacco: Not on file   Substance Use Topics    Alcohol use: Yes     Alcohol/week: 0.0 standard drinks of alcohol        Family History   Problem Relation Age of Onset    Hypertension Sister     Hypertension Sister     Elevated Lipids Brother     Hypertension Brother     Elevated Lipids Father     Hypertension Mother     Elevated Lipids Mother      Allergies   Allergen Reactions    Codeine Nausea And Vomiting    Sulfa Antibiotics Rash     Severe whole body rash.        Prior to Admission medications    Medication Sig Start Date End Date Taking? Authorizing Provider   traZODone (DESYREL) 100 MG tablet Take 1 tablet by mouth nightly   Yes ProviderJulian MD   escitalopram (LEXAPRO) 20 MG tablet Take 1 tablet by mouth daily   Yes ProviderJulian MD   Biotin 5000 MCG CAPS Take by mouth   Yes ProviderJulian MD   vitamin B-1 (THIAMINE) 100 MG tablet Take 1 tablet by mouth daily   Yes ProviderJulian MD   ALPRAZOLAM PO Take by mouth   Yes Julian Damon MD   amLODIPine (NORVASC) 10 MG tablet Take 1 tablet by mouth daily 11/12/21  Yes Automatic Reconciliation, Ar   escitalopram (LEXAPRO) 10

## 2025-05-06 NOTE — PROGRESS NOTES
[facility] Adult  Hospitalist Group                                                                                          Hospitalist Progress Note  Jimmie Nickerson MD  Office Phone: [phone]        Date of Service:  2025  NAME:  Katie Rico  :  1970  MRN:  [id number]       Admission Summary:   Katie Rico is a 55 y.o. female with a history of hypertension, alcohol use, anemia, depression who presents with lightheadedness.  Patient describes lightheadedness after urinating about an hour ago.  She felt lightheaded after she stood up, fell to the ground.  She reports she has had generalized weakness since then, feels very anxious.  Blood pressure elevated and route, glucose 125 and route otherwise vital signs unremarkable.  Reports feeling \"off\" today however cannot specify exactly.  No changes to her vision.  She feels no lightheadedness or dizziness at rest.     She reports multiple similar episodes in the past.  She does still take her thiazide, does not currently take sodium supplementation.  Endorses ongoing alcohol use.         Interval history / Subjective:   Endorsed chronic history of falls.  The most recent event, patient had completed voiding in the toilet and had gotten up when she began to feel lightheaded, dizzy, and fell on the floor and experiencing head trauma.  She denies losing consciousness, biting her tongue, urinary/fecal incontinence, fasting, history of seizures, recent alcohol use.  She endorses drinking 2 beers    She had a similar admission in [facility] on 2024 where she was found at home confused with hypernatremia and electrolyte abnormalities. CT head showed no acute pathology.  EEG was done which showed no signs of seizure.  TSH and cortisol levels were unremarkable.  During admission patient was treated for Warnicke encephalopathy empirically.     Assessment & Plan:     Presyncope  -possibly 2/2 to vasovagal or medication side

## 2025-05-06 NOTE — PLAN OF CARE
Problem: Physical Therapy - Adult  Goal: By Discharge: Performs mobility at highest level of function for planned discharge setting.  See evaluation for individualized goals.  Description: FUNCTIONAL STATUS PRIOR TO ADMISSION: Patient was independent and active without use of DME.    HOME SUPPORT PRIOR TO ADMISSION: The patient lived alone with no local support.    Physical Therapy Goals  Initiated 5/6/2025  1.  Patient will move from supine to sit and sit to supine  in bed with independence within 7 day(s).    2.  Patient will transfer from bed to chair and chair to bed with independence using the least restrictive device within 7 day(s).  3.  Patient will perform sit to stand with independence within 7 day(s).  4.  Patient will ambulate with independence for 100 feet with the least restrictive device within 7 day(s).   5.  Patient will ascend/descend 4 stairs with single handrail(s) with independence within 7 day(s).    Outcome: Progressing   PHYSICAL THERAPY EVALUATION    Patient: Katie Rico (55 y.o. female)  Date: 5/6/2025  Primary Diagnosis: Syncope and collapse [R55]  Dizziness [R42]  Dyspnea [R06.00]  Acute nonintractable headache, unspecified headache type [R51.9]       Precautions: Restrictions/Precautions  Restrictions/Precautions: Fall Risk            ASSESSMENT :   DEFICITS/IMPAIRMENTS:   The patient is limited by decreased functional mobility, strength, activity tolerance, coordination, balance     Based on the impairments listed above the patient is limited by generalized weakness and poor balance. Patient reports signs and symptoms of syncope and possible orthostatic hypotension, however orthostatic vitals were negative during evaluation. Patient benefits from contact guard assist and use of rolling walker to improve steadiness during ambulation.      Pulse BP Patient Position    05/06/25 0820 (!) 117 (!) 151/101 Standing   05/06/25 0817 (!) 102 (!) 159/104 Sitting   05/06/25 0815 97 (!) 145/100

## 2025-05-06 NOTE — PROGRESS NOTES
1910 Bedside and Verbal shift change report given to JORDAN Avalos (oncoming nurse) by JORDAN Rahman (offgoing nurse). Report included the following information Nurse Handoff Report, Intake/Output, MAR, and Recent Results.        0510 blood sent to the lab

## 2025-05-06 NOTE — ED NOTES
TRANSFER - OUT REPORT:    Verbal report given to JORDAN Hinojosa on Katie Rico  being transferred to telemetry for routine progression of patient care       Report consisted of patient's Situation, Background, Assessment and   Recommendations(SBAR).     Information from the following report(s) Nurse Handoff Report, ED Encounter Summary, Adult Overview, MAR, Recent Results, and Cardiac Rhythm SR  was reviewed with the receiving nurse.    [place] Fall Assessment:    Presents to emergency department  because of falls (Syncope, seizure, or loss of consciousness): Yes  Age > 70: No  Altered Mental Status, Intoxication with alcohol or substance confusion (Disorientation, impaired judgment, poor safety awaremess, or inability to follow instructions): No  Impaired Mobility: Ambulates or transfers with assistive devices or assistance; Unable to ambulate or transer.: No  Nursing Judgement: Yes          Lines:   Peripheral IV 05/05/25 Right Antecubital (Active)   Site Assessment Clean, dry & intact;Clean;Dry 05/05/25 1643   Line Status Blood return noted;Flushed;Normal saline locked;Specimen collected 05/05/25 1643   Phlebitis Assessment No symptoms 05/05/25 1643   Infiltration Assessment 0 05/05/25 1643        Opportunity for questions and clarification was provided.      Patient transported with:  Monitor and Registered Nurse

## 2025-05-07 VITALS
BODY MASS INDEX: 25.38 KG/M2 | WEIGHT: 152.34 LBS | DIASTOLIC BLOOD PRESSURE: 91 MMHG | OXYGEN SATURATION: 95 % | RESPIRATION RATE: 16 BRPM | TEMPERATURE: 98.6 F | SYSTOLIC BLOOD PRESSURE: 141 MMHG | HEART RATE: 101 BPM | HEIGHT: 65 IN

## 2025-05-07 LAB
-: NORMAL
ALBUMIN SERPL-MCNC: 3 G/DL (ref 3.5–5)
ALBUMIN/GLOB SERPL: 0.9 (ref 1.1–2.2)
ALP SERPL-CCNC: 97 U/L (ref 45–117)
ALT SERPL-CCNC: 20 U/L (ref 12–78)
ANION GAP SERPL CALC-SCNC: 6 MMOL/L (ref 2–12)
AST SERPL-CCNC: 42 U/L (ref 15–37)
BASOPHILS # BLD: 0.03 K/UL (ref 0–0.1)
BASOPHILS NFR BLD: 0.3 % (ref 0–1)
BILIRUB SERPL-MCNC: 0.5 MG/DL (ref 0.2–1)
BUN SERPL-MCNC: 7 MG/DL (ref 6–20)
BUN/CREAT SERPL: 9 (ref 12–20)
CALCIUM SERPL-MCNC: 8.8 MG/DL (ref 8.5–10.1)
CHLORIDE SERPL-SCNC: 96 MMOL/L (ref 97–108)
CO2 SERPL-SCNC: 28 MMOL/L (ref 21–32)
CREAT SERPL-MCNC: 0.81 MG/DL (ref 0.55–1.02)
DIFFERENTIAL METHOD BLD: ABNORMAL
EKG ATRIAL RATE: 94 BPM
EKG DIAGNOSIS: NORMAL
EKG P AXIS: 39 DEGREES
EKG P-R INTERVAL: 166 MS
EKG Q-T INTERVAL: 402 MS
EKG QRS DURATION: 74 MS
EKG QTC CALCULATION (BAZETT): 502 MS
EKG R AXIS: 4 DEGREES
EKG T AXIS: 19 DEGREES
EKG VENTRICULAR RATE: 94 BPM
EOSINOPHIL # BLD: 0.11 K/UL (ref 0–0.4)
EOSINOPHIL NFR BLD: 1 % (ref 0–7)
ERYTHROCYTE [DISTWIDTH] IN BLOOD BY AUTOMATED COUNT: 15 % (ref 11.5–14.5)
GLOBULIN SER CALC-MCNC: 3.4 G/DL (ref 2–4)
GLUCOSE SERPL-MCNC: 100 MG/DL (ref 65–100)
HAV IGM SER QL: NONREACTIVE
HBV CORE IGM SER QL: NONREACTIVE
HBV SURFACE AG SER QL: 0.22 INDEX
HBV SURFACE AG SER QL: NEGATIVE
HCT VFR BLD AUTO: 26.6 % (ref 35–47)
HCV AB SER IA-ACNC: 0.26 INDEX
HCV AB SERPL QL IA: NONREACTIVE
HGB BLD-MCNC: 9 G/DL (ref 11.5–16)
IMM GRANULOCYTES # BLD AUTO: 0.13 K/UL (ref 0–0.04)
IMM GRANULOCYTES NFR BLD AUTO: 1.2 % (ref 0–0.5)
LYMPHOCYTES # BLD: 2.29 K/UL (ref 0.8–3.5)
LYMPHOCYTES NFR BLD: 21.6 % (ref 12–49)
MAGNESIUM SERPL-MCNC: 1.3 MG/DL (ref 1.6–2.4)
MCH RBC QN AUTO: 36.1 PG (ref 26–34)
MCHC RBC AUTO-ENTMCNC: 33.8 G/DL (ref 30–36.5)
MCV RBC AUTO: 106.8 FL (ref 80–99)
MONOCYTES # BLD: 0.78 K/UL (ref 0–1)
MONOCYTES NFR BLD: 7.4 % (ref 5–13)
NEUTS SEG # BLD: 7.24 K/UL (ref 1.8–8)
NEUTS SEG NFR BLD: 68.5 % (ref 32–75)
NRBC # BLD: 0 K/UL (ref 0–0.01)
NRBC BLD-RTO: 0 PER 100 WBC
OSMOLALITY UR: 124 MOSM/KG H2O
OSMOLALITY UR: 158 MOSM/KG H2O
PHOSPHATE SERPL-MCNC: 3.1 MG/DL (ref 2.6–4.7)
PLATELET # BLD AUTO: 298 K/UL (ref 150–400)
PMV BLD AUTO: 8.8 FL (ref 8.9–12.9)
POTASSIUM SERPL-SCNC: 3.8 MMOL/L (ref 3.5–5.1)
PROT SERPL-MCNC: 6.4 G/DL (ref 6.4–8.2)
RBC # BLD AUTO: 2.49 M/UL (ref 3.8–5.2)
SODIUM SERPL-SCNC: 130 MMOL/L (ref 136–145)
SODIUM UR-SCNC: 44 MMOL/L
WBC # BLD AUTO: 10.6 K/UL (ref 3.6–11)

## 2025-05-07 PROCEDURE — 80053 COMPREHEN METABOLIC PANEL: CPT

## 2025-05-07 PROCEDURE — 6360000002 HC RX W HCPCS: Performed by: HOSPITALIST

## 2025-05-07 PROCEDURE — 85025 COMPLETE CBC W/AUTO DIFF WBC: CPT

## 2025-05-07 PROCEDURE — 84300 ASSAY OF URINE SODIUM: CPT

## 2025-05-07 PROCEDURE — 93010 ELECTROCARDIOGRAM REPORT: CPT | Performed by: SPECIALIST

## 2025-05-07 PROCEDURE — 2500000003 HC RX 250 WO HCPCS: Performed by: INTERNAL MEDICINE

## 2025-05-07 PROCEDURE — 80074 ACUTE HEPATITIS PANEL: CPT

## 2025-05-07 PROCEDURE — 83735 ASSAY OF MAGNESIUM: CPT

## 2025-05-07 PROCEDURE — 6370000000 HC RX 637 (ALT 250 FOR IP): Performed by: HOSPITALIST

## 2025-05-07 PROCEDURE — 6370000000 HC RX 637 (ALT 250 FOR IP): Performed by: INTERNAL MEDICINE

## 2025-05-07 PROCEDURE — 2500000003 HC RX 250 WO HCPCS: Performed by: HOSPITALIST

## 2025-05-07 PROCEDURE — 2580000003 HC RX 258: Performed by: INTERNAL MEDICINE

## 2025-05-07 PROCEDURE — 36415 COLL VENOUS BLD VENIPUNCTURE: CPT

## 2025-05-07 PROCEDURE — 83935 ASSAY OF URINE OSMOLALITY: CPT

## 2025-05-07 PROCEDURE — 84100 ASSAY OF PHOSPHORUS: CPT

## 2025-05-07 RX ORDER — ESCITALOPRAM OXALATE 20 MG/1
20 TABLET ORAL DAILY
Qty: 30 TABLET | Refills: 0 | Status: SHIPPED | OUTPATIENT
Start: 2025-05-07

## 2025-05-07 RX ORDER — 0.9 % SODIUM CHLORIDE 0.9 %
500 INTRAVENOUS SOLUTION INTRAVENOUS ONCE
Status: DISCONTINUED | OUTPATIENT
Start: 2025-05-07 | End: 2025-05-07

## 2025-05-07 RX ORDER — LANOLIN ALCOHOL/MO/W.PET/CERES
100 CREAM (GRAM) TOPICAL DAILY
Qty: 30 TABLET | Refills: 0 | Status: SHIPPED | OUTPATIENT
Start: 2025-05-08

## 2025-05-07 RX ORDER — HYDROXYZINE HYDROCHLORIDE 50 MG/1
50 TABLET, FILM COATED ORAL 3 TIMES DAILY PRN
Qty: 30 TABLET | Refills: 0 | Status: SHIPPED | OUTPATIENT
Start: 2025-05-07 | End: 2025-05-17

## 2025-05-07 RX ORDER — MAGNESIUM SULFATE HEPTAHYDRATE 40 MG/ML
2000 INJECTION, SOLUTION INTRAVENOUS ONCE
Status: COMPLETED | OUTPATIENT
Start: 2025-05-07 | End: 2025-05-07

## 2025-05-07 RX ORDER — AMLODIPINE BESYLATE 10 MG/1
10 TABLET ORAL DAILY
Qty: 30 TABLET | Refills: 0 | Status: SHIPPED | OUTPATIENT
Start: 2025-05-07

## 2025-05-07 RX ORDER — 0.9 % SODIUM CHLORIDE 0.9 %
1000 INTRAVENOUS SOLUTION INTRAVENOUS ONCE
Status: COMPLETED | OUTPATIENT
Start: 2025-05-07 | End: 2025-05-07

## 2025-05-07 RX ORDER — LISINOPRIL 40 MG/1
40 TABLET ORAL DAILY
Qty: 30 TABLET | Refills: 0 | Status: SHIPPED | OUTPATIENT
Start: 2025-05-07

## 2025-05-07 RX ORDER — FOLIC ACID 1 MG/1
1 TABLET ORAL DAILY
Qty: 30 TABLET | Refills: 0 | Status: SHIPPED | OUTPATIENT
Start: 2025-05-08

## 2025-05-07 RX ORDER — TRAZODONE HYDROCHLORIDE 50 MG/1
50 TABLET ORAL NIGHTLY
Qty: 30 TABLET | Refills: 0 | Status: SHIPPED | OUTPATIENT
Start: 2025-05-07

## 2025-05-07 RX ORDER — BUTALBITAL, ACETAMINOPHEN AND CAFFEINE 50; 325; 40 MG/1; MG/1; MG/1
1 TABLET ORAL EVERY 4 HOURS PRN
Status: DISCONTINUED | OUTPATIENT
Start: 2025-05-07 | End: 2025-05-07 | Stop reason: HOSPADM

## 2025-05-07 RX ORDER — MULTIVITAMIN WITH IRON
1 TABLET ORAL DAILY
Qty: 30 TABLET | Refills: 0 | Status: SHIPPED | OUTPATIENT
Start: 2025-05-08

## 2025-05-07 RX ADMIN — MAGNESIUM SULFATE HEPTAHYDRATE 2000 MG: 40 INJECTION, SOLUTION INTRAVENOUS at 10:25

## 2025-05-07 RX ADMIN — BUTALBITAL, ACETAMINOPHEN AND CAFFEINE 1 TABLET: 325; 50; 40 TABLET ORAL at 12:36

## 2025-05-07 RX ADMIN — SODIUM CHLORIDE, PRESERVATIVE FREE 10 ML: 5 INJECTION INTRAVENOUS at 08:25

## 2025-05-07 RX ADMIN — SODIUM CHLORIDE 1000 ML: 0.9 INJECTION, SOLUTION INTRAVENOUS at 08:27

## 2025-05-07 RX ADMIN — FOLIC ACID 1 MG: 1 TABLET ORAL at 08:21

## 2025-05-07 RX ADMIN — LISINOPRIL 40 MG: 20 TABLET ORAL at 08:21

## 2025-05-07 RX ADMIN — THERA TABS 1 TABLET: TAB at 08:21

## 2025-05-07 RX ADMIN — Medication 100 MG: at 08:21

## 2025-05-07 RX ADMIN — AMLODIPINE BESYLATE 10 MG: 5 TABLET ORAL at 08:21

## 2025-05-07 RX ADMIN — ESCITALOPRAM OXALATE 20 MG: 10 TABLET ORAL at 08:21

## 2025-05-07 RX ADMIN — ENOXAPARIN SODIUM 40 MG: 100 INJECTION SUBCUTANEOUS at 08:21

## 2025-05-07 ASSESSMENT — PAIN SCALES - GENERAL
PAINLEVEL_OUTOF10: 2
PAINLEVEL_OUTOF10: 6

## 2025-05-07 ASSESSMENT — PAIN - FUNCTIONAL ASSESSMENT: PAIN_FUNCTIONAL_ASSESSMENT: PREVENTS OR INTERFERES SOME ACTIVE ACTIVITIES AND ADLS

## 2025-05-07 ASSESSMENT — PAIN DESCRIPTION - ORIENTATION: ORIENTATION: ANTERIOR;POSTERIOR

## 2025-05-07 ASSESSMENT — PAIN DESCRIPTION - DESCRIPTORS: DESCRIPTORS: OTHER (COMMENT)

## 2025-05-07 ASSESSMENT — PAIN DESCRIPTION - LOCATION: LOCATION: HEAD

## 2025-05-07 NOTE — PLAN OF CARE
Problem: Discharge Planning  Goal: Discharge to home or other facility with appropriate resources  5/6/2025 2025 by Bailey aMrr RN  Outcome: Progressing  5/6/2025 1039 by Lacey Cerna RN  Outcome: Progressing  Flowsheets (Taken 5/6/2025 0750)  Discharge to home or other facility with appropriate resources: Identify barriers to discharge with patient and caregiver     Problem: Pain  Goal: Verbalizes/displays adequate comfort level or baseline comfort level  5/6/2025 2025 by Bailey Marr RN  Outcome: Progressing  5/6/2025 1039 by Lacey Cerna RN  Outcome: Progressing     Problem: Safety - Adult  Goal: Free from fall injury  5/6/2025 2025 by Bailey Marr RN  Outcome: Progressing  5/6/2025 1039 by Lacey Cerna RN  Outcome: Progressing     Problem: Occupational Therapy - Adult  Goal: By Discharge: Performs self-care activities at highest level of function for planned discharge setting.  See evaluation for individualized goals.  Description: FUNCTIONAL STATUS PRIOR TO ADMISSION:  Pt was independent and using her insurance transportation.  Prior Level of Assist for ADLs: Independent, Prior Level of Assist for Homemaking: Independent, Prior Level of Assist for Transfers: Independent, Active : No     HOME SUPPORT: Patient lived alone with minimal local support.    Occupational Therapy Goals:  Initiated 5/6/2025  1.  Patient will perform grooming standing at sink with Modified [place] within 7 day(s).  2.  Patient will perform lower body dressing with Modified [place] within 7 day(s).  3.  Patient will perform toilet transfers with Modified [place]  within 7 day(s).  4.  Patient will perform all aspects of toileting with Modified [place] within 7 day(s).  5.  Patient will participate in upper extremity therapeutic exercise/activities with [place] for 10 minutes within 7 day(s).    6.  Patient will utilize energy conservation techniques during functional

## 2025-05-07 NOTE — PROGRESS NOTES
Music Therapy Assessment  [facility]    Katie Rico [id number]     1970  55 y.o.  female    Patient Telephone Number: [phone] (home)   Taoist Affiliation: Caodaism   Language: English   Patient Active Problem List    Diagnosis Date Noted    Syncope and collapse 05/05/2025    Dyspnea 11/10/2021    Alcohol abuse 09/18/2013    Anemia 09/18/2013    Dysthymic disorder 08/23/2012    HTN (hypertension) 12/10/2011        Date: 5/7/2025            Total Time Calculated: 10 min          [facility] 2 MED SURG & TELE    Session Observations:  Referred by Jimmie Nickerson MD; This music therapist (MT) peered into the patient's (pt) room and noted hearing the pt in the shower and preparing for her upcoming d/c home. In wanting to honor her privacy, this MT concluded the attempted visit and exited.    SHIV Pastrana (Music Therapist, Board Certified)  Spiritual Health Department  Referral-Based Services

## 2025-05-07 NOTE — DISCHARGE INSTRUCTIONS
Dear Ms. Rico    You were admitted to [facility] following a fall. After a thorough evaluation, including a review of your history, physical examination, and telemetry monitoring, it was determined that the most likely cause of your fall was orthostatic hypotension from dehydration and the use of hydrochlorothiazide. Orthostatic hypotension, also referred to as postural hypotension, is a drop in blood pressure that occurs when transitioning from a lying to a sitting or standing position. This drop in blood pressure can cause dizziness, lightheadedness, or even fainting. To minimize the risk of orthostatic hypotension, it is important to transition between positions slowly, waiting approximately five minutes before standing. We encourage you to stay well-hydrated, adjust your sitting posture, stand up slowly, avoid hot and humid weather, and consider wearing compression stockings with or without an abdominal binder. These measures are essential in reducing the likelihood of recurrent symptoms. Additionally, we recommend frequent monitoring by family and friends when you are getting up. We also discontinued your hydrochlorothiazide.     It was our privilege to care for you in the Medical/Surgical Department today. When you choose to visit our 2nd-floor Medical/Surgical Department, you entrust us with your health, comfort, and safety. Our physicians and nurses deeply honor that trust and truly appreciate the opportunity to care for you and your loved ones.    We value your feedback and encourage you to complete any survey you may receive about your experience in the Medical/Surgical Department today. Your input is important to us, and we are committed to listening and learning from what you have to share.    Thank you for choosing us for your care.

## 2025-05-07 NOTE — PROGRESS NOTES
Face to Face Order for Rolling Walker          Pt Name  Katie Rico   Date of Birth 1970   Age  55 y.o.   Medical Record Number  [id number]   Room Number  201/01   Admit date:  5/5/2025   Date of Face to Face: 5/7/2025     Admission Diagnosis:  Syncope and collapse     Diagnoses Present on Admission:   Syncope and collapse     Past Medical History:   Diagnosis Date    Anxiety     Depression     Hypertension     Ill-defined condition     ETOH abuse      Vitals:    05/07/25 0749   BP: (!) 146/99   Pulse: 88   Resp: 18   Temp: 98.8 °F (37.1 °C)   SpO2: 95%           Allergies   Allergen Reactions    Codeine Nausea And Vomiting    Sulfa Antibiotics Rash     Severe whole body rash.          Katie Rico was evaluated today and a DME order was entered for a wheeled walker because she requires this to successfully complete daily living tasks of personal cares and ambulating.  A wheeled walker is necessary due to the patient's inability to ambulate only by pushing a walker instead of a lesser assistive device such as a cane, crutch, or standard walker.  The need for this equipment was discussed with the patient and she understands and is in agreement.       Lorenza Oliveira, APRN - NP  will be responsible for signing the Plan of Care and will follow/coordinate ongoing care.    I certify that I am taking care of the patient today (Please see hospital Discharge Records for details of clinical issues pertaining to this order).      ________________________________________________________________________  Jimmie Nickerson MD

## 2025-05-07 NOTE — PROGRESS NOTES
0717- Bedside report received from off going nurse JORDAN Avalos. Pt awake on bed.    9841- Discharge education given to pt. Pt verbalized understanding. IV line out. Pt changing her clothes to go. Home medication given to patient.

## 2025-05-07 NOTE — CARE COORDINATION
[facility]  IDR NOTE             RUR: 10 %   Readmission? No  1st IM letter given? No  1st  letter given: No    No compliance letters  inpatient and Medicaid Plan    Asked to assist with PCP appointment.  Found correct provider and made changes in the system.  Appointment on AVS>   Put information for Neurology . Since she was not seen inpatient- will take a while to get an appointment.     See Ms. Juarez note for final discharge plan.     Lorenza Ocampo APRN - NP  Nurse Practitioner [phone] [facility] [address] [facility] [address]      Next Steps: Follow up on 5/13/2025  Instructions: your appointment is  May 13, 2025 at 1:30PM     Sara ANTOINE RN    [phone]

## 2025-05-07 NOTE — DISCHARGE SUMMARY
Discharge Summary   Please note that this dictation was completed with [employer], the computer voice recognition software.  Quite often unanticipated grammatical, syntax, homophones, and other interpretive errors are inadvertently transcribed by the computer software.  Please disregard these errors.  Please excuse any errors that have escaped final proofreading.    PATIENT ID: Katie Rico  MRN: [id number]   YOB: 1970    DATE OF ADMISSION: 5/5/2025  4:22 PM    DATE OF DISCHARGE: 5/7/2025  PRIMARY CARE PROVIDER: Lorenza Ocampo APRN - NP         ATTENDING PHYSICIAN: Jimmie Nickerson MD  DISCHARGING PROVIDER: Jimmie Nickerson MD       CONSULTATIONS: IP CONSULT TO CASE MANAGEMENT  IP CONSULT TO PHARMACY  IP CONSULT TO SOCIAL WORK  IP CONSULT TO PSYCHIATRY    PROCEDURES/SURGERIES: * No surgery found *    ADMITTING HPI from excerpted H&P   Katie Rico is a 55 y.o. female with a history of hypertension, alcohol use, anemia, depression who presents with lightheadedness.  Patient describes lightheadedness after urinating about an hour ago.  She felt lightheaded after she stood up, fell to the ground.  She reports she has had generalized weakness since then, feels very anxious.  Blood pressure elevated and route, glucose 125 and route otherwise vital signs unremarkable.  Reports feeling \"off\" today however cannot specify exactly.  No changes to her vision.  She feels no lightheadedness or dizziness at rest.     She reports multiple similar episodes in the past.  She does still take her thiazide, does not currently take sodium supplementation.  Endorses ongoing alcohol use.       HOSPITAL COURSE & DISCHARGE DIAGNOSIS/ PLAN:     Presyncope  -likely 2/2 to vasovagal and/or medication side effect from htz  -ECG: normal sinus rhythm.   -orthostatic vitals positive  -troponin, POC glucose, TSH and BNP within normal limits  -Echo LVEF of 60 - 65%. Left ventricle size is normal. Normal wall thickness. Normal wall motion.

## 2025-05-08 ENCOUNTER — TELEPHONE (OUTPATIENT)
Facility: HOSPITAL | Age: 55
End: 2025-05-08

## 2025-05-08 NOTE — CARE COORDINATION
Transition of Care Plan:    RUR: 9%  Prior Level of Functioning: IND  Disposition:   RECEIVED ACCEPTANCE FROM  [facility] FOR SUBSTANCE/RECOVERY AND MENTAL HEALTH  ELLIE: 5/8/25  If SNF or IPR: Date FOC offered: NA    Follow up appointments: ON AVS    DME needed: WALKER ORDERED FROM [employer] FREEDOM OF CHOICE EXPLAINED AND PATIENT STATED SHE HAD NO CHOICE EXCEPT THAT SHE HAS IT BEFORE SHE LEAVES.    Transportation at discharge: [facility]  $17.  DR. Samaniego INFORMED THAT PATIENT NEEDED TO ARRIVE BEFORE 2 PM AND MEDICAID TRANSPORT STATED THEY COULD TAKE UP TO 4 HOURS  CM RECEIVED PERMISSION FROM LEAD CM MS WEEMS TO ORDER [employer] TO ENABLE PATIENT TO HAVE THE BEST POSSIBLE DISCHARGE.  IM/IMM Medicare/ letter given: N/A  Is patient a [place] and connected with [facility]? N/A  Caregiver Contact: [facility] [place]  [phone]  Discharge Caregiver contacted prior to discharge? YES  Care Conference needed? N/A  Barriers to discharge: N/A      DELIVERING VERIFIED RECEIPT OF REFERRAL AND STATED THEY WILL ACCEPT PATIENT AND REQUESTED SHE GET THERE AS SOON AS POSSIBLE AND BY 1PM NO LATER THAN 2PM AS PATIENT NEEDED TO GET ITEMS FROM THE MOTEL SHE WAS STAYING IN PREVIOUSLY AS WELL AS HER MEDICATION AND COMPETE THE INTAKE PROCESS.      [facility]     [address] [place] [address] Phone: [phone]      Next Steps: Follow up  Instructions: hospital follow-up  you will see the NP. Lorenza Ocampo  May 13, 2025 @  1:30PM. Please call the office if you need to change this date and time.    Lorenza Ocampo, GEORGINA - NP PCP - General Nurse Practitioner [phone] [facility] [address] [facility] [address]     Next Steps: Follow up on 5/13/2025  Instructions: your appointment is  May 13, 2025 at 1:30PM    [facility]  Neurology [phone] [address] [facility]

## 2025-05-08 NOTE — TELEPHONE ENCOUNTER
CM called patient by telephone to perform post discharge assessment and for the purpose of follow up call from inpatient discharge to check on environmental challenges/medications/appointment follow up/and questions/concerns.     CM left VM for patient to return call.     CM will attempt a 2nd call.    Darien Madison CM   [phone]

## 2025-05-13 ENCOUNTER — FOLLOWUP TELEPHONE ENCOUNTER (OUTPATIENT)
Facility: HOSPITAL | Age: 55
End: 2025-05-13

## 2025-05-13 NOTE — TELEPHONE ENCOUNTER
CM called patient by telephone to perform post discharge assessment and for the purpose of follow up call from inpatient discharge to check on environmental challenges/medications/appointment follow up/and questions/concerns.      Pt did not answer the phone. CM left a HIPAA compliant VM and provided a call back number.    CM to close case and will reopen upon further contact from pt.    Kary Arriaza LMSW,   [phone]

## 2025-08-18 ENCOUNTER — HOSPITAL ENCOUNTER (EMERGENCY)
Facility: HOSPITAL | Age: 55
Discharge: HOME | End: 2025-08-18
Attending: EMERGENCY MEDICINE
Payer: MEDICAID

## 2025-08-18 ENCOUNTER — APPOINTMENT (OUTPATIENT)
Facility: HOSPITAL | Age: 55
End: 2025-08-18
Payer: MEDICAID

## 2025-08-18 VITALS
HEART RATE: 81 BPM | OXYGEN SATURATION: 98 % | HEIGHT: 65 IN | WEIGHT: 152.56 LBS | DIASTOLIC BLOOD PRESSURE: 68 MMHG | TEMPERATURE: 97.7 F | RESPIRATION RATE: 20 BRPM | SYSTOLIC BLOOD PRESSURE: 95 MMHG | BODY MASS INDEX: 25.42 KG/M2

## 2025-08-18 DIAGNOSIS — E87.1 HYPONATREMIA: ICD-10-CM

## 2025-08-18 DIAGNOSIS — Z87.898 HISTORY OF ALCOHOL USE: ICD-10-CM

## 2025-08-18 DIAGNOSIS — E86.0 DEHYDRATION: ICD-10-CM

## 2025-08-18 DIAGNOSIS — R05.1 ACUTE COUGH: Primary | ICD-10-CM

## 2025-08-18 LAB
ALBUMIN SERPL-MCNC: 3.7 G/DL (ref 3.5–5.2)
ALBUMIN/GLOB SERPL: 0.9 (ref 1.1–2.2)
ALP SERPL-CCNC: 94 U/L (ref 35–104)
ALT SERPL-CCNC: 21 U/L (ref 10–35)
ANION GAP SERPL CALC-SCNC: 15 MMOL/L (ref 2–14)
AST SERPL-CCNC: 35 U/L (ref 10–35)
BASOPHILS # BLD: 0.03 K/UL (ref 0–0.1)
BASOPHILS NFR BLD: 0.3 % (ref 0–1)
BILIRUB SERPL-MCNC: 0.4 MG/DL (ref 0–1.2)
BUN SERPL-MCNC: 11 MG/DL (ref 6–20)
BUN/CREAT SERPL: 9 (ref 12–20)
CALCIUM SERPL-MCNC: 9.8 MG/DL (ref 8.6–10)
CHLORIDE SERPL-SCNC: 86 MMOL/L (ref 98–107)
CO2 SERPL-SCNC: 26 MMOL/L (ref 20–29)
CREAT SERPL-MCNC: 1.34 MG/DL (ref 0.6–1)
D DIMER PPP FEU-MCNC: 0.6 MG/L FEU (ref 0–0.65)
DIFFERENTIAL METHOD BLD: ABNORMAL
EKG ATRIAL RATE: 84 BPM
EKG DIAGNOSIS: NORMAL
EKG P AXIS: 46 DEGREES
EKG P-R INTERVAL: 156 MS
EKG Q-T INTERVAL: 398 MS
EKG QRS DURATION: 80 MS
EKG QTC CALCULATION (BAZETT): 470 MS
EKG R AXIS: 6 DEGREES
EKG T AXIS: 40 DEGREES
EKG VENTRICULAR RATE: 84 BPM
EOSINOPHIL # BLD: 0.01 K/UL (ref 0–0.4)
EOSINOPHIL NFR BLD: 0.1 % (ref 0–7)
ERYTHROCYTE [DISTWIDTH] IN BLOOD BY AUTOMATED COUNT: 14.6 % (ref 11.5–14.5)
ETHANOL SERPL-MCNC: 109 MG/DL (ref 0–0.08)
FLUAV RNA SPEC QL NAA+PROBE: NOT DETECTED
FLUBV RNA SPEC QL NAA+PROBE: NOT DETECTED
GLOBULIN SER CALC-MCNC: 4.1 G/DL (ref 2–4)
GLUCOSE SERPL-MCNC: 112 MG/DL (ref 65–100)
HCT VFR BLD AUTO: 35 % (ref 35–47)
HGB BLD-MCNC: 11.9 G/DL (ref 11.5–16)
IMM GRANULOCYTES # BLD AUTO: 0.11 K/UL (ref 0–0.04)
IMM GRANULOCYTES NFR BLD AUTO: 1.1 % (ref 0–0.5)
LYMPHOCYTES # BLD: 2.26 K/UL (ref 0.8–3.5)
LYMPHOCYTES NFR BLD: 23 % (ref 12–49)
MCH RBC QN AUTO: 27.2 PG (ref 26–34)
MCHC RBC AUTO-ENTMCNC: 34 G/DL (ref 30–36.5)
MCV RBC AUTO: 79.9 FL (ref 80–99)
MONOCYTES # BLD: 0.54 K/UL (ref 0–1)
MONOCYTES NFR BLD: 5.5 % (ref 5–13)
NEUTS SEG # BLD: 6.86 K/UL (ref 1.8–8)
NEUTS SEG NFR BLD: 70 % (ref 32–75)
NRBC # BLD: 0 K/UL (ref 0–0.01)
NRBC BLD-RTO: 0 PER 100 WBC
NT PRO BNP: 296 PG/ML (ref 0–125)
PLATELET # BLD AUTO: 343 K/UL (ref 150–400)
PMV BLD AUTO: 8.7 FL (ref 8.9–12.9)
POTASSIUM SERPL-SCNC: 3.5 MMOL/L (ref 3.5–5.1)
PROT SERPL-MCNC: 7.8 G/DL (ref 6.4–8.3)
RBC # BLD AUTO: 4.38 M/UL (ref 3.8–5.2)
SARS-COV-2 RNA RESP QL NAA+PROBE: NOT DETECTED
SODIUM SERPL-SCNC: 126 MMOL/L (ref 136–145)
SOURCE: NORMAL
TROPONIN T SERPL HS-MCNC: 16.9 NG/L (ref 0–14)
TROPONIN T SERPL HS-MCNC: 21 NG/L (ref 0–14)
WBC # BLD AUTO: 9.8 K/UL (ref 3.6–11)

## 2025-08-18 PROCEDURE — 36415 COLL VENOUS BLD VENIPUNCTURE: CPT

## 2025-08-18 PROCEDURE — 83880 ASSAY OF NATRIURETIC PEPTIDE: CPT

## 2025-08-18 PROCEDURE — 80053 COMPREHEN METABOLIC PANEL: CPT

## 2025-08-18 PROCEDURE — 87636 SARSCOV2 & INF A&B AMP PRB: CPT

## 2025-08-18 PROCEDURE — 2580000003 HC RX 258: Performed by: EMERGENCY MEDICINE

## 2025-08-18 PROCEDURE — 6360000004 HC RX CONTRAST MEDICATION: Performed by: EMERGENCY MEDICINE

## 2025-08-18 PROCEDURE — 96360 HYDRATION IV INFUSION INIT: CPT

## 2025-08-18 PROCEDURE — 82077 ASSAY SPEC XCP UR&BREATH IA: CPT

## 2025-08-18 PROCEDURE — 93005 ELECTROCARDIOGRAM TRACING: CPT | Performed by: EMERGENCY MEDICINE

## 2025-08-18 PROCEDURE — 84484 ASSAY OF TROPONIN QUANT: CPT

## 2025-08-18 PROCEDURE — 85379 FIBRIN DEGRADATION QUANT: CPT

## 2025-08-18 PROCEDURE — 71275 CT ANGIOGRAPHY CHEST: CPT

## 2025-08-18 PROCEDURE — 99285 EMERGENCY DEPT VISIT HI MDM: CPT

## 2025-08-18 PROCEDURE — 85025 COMPLETE CBC W/AUTO DIFF WBC: CPT

## 2025-08-18 RX ORDER — 0.9 % SODIUM CHLORIDE 0.9 %
500 INTRAVENOUS SOLUTION INTRAVENOUS ONCE
Status: COMPLETED | OUTPATIENT
Start: 2025-08-18 | End: 2025-08-18

## 2025-08-18 RX ORDER — IOPAMIDOL 755 MG/ML
100 INJECTION, SOLUTION INTRAVASCULAR
Status: COMPLETED | OUTPATIENT
Start: 2025-08-18 | End: 2025-08-18

## 2025-08-18 RX ORDER — FLUTICASONE PROPIONATE 50 MCG
1 SPRAY, SUSPENSION (ML) NASAL DAILY
Qty: 32 G | Refills: 0 | Status: SHIPPED | OUTPATIENT
Start: 2025-08-18 | End: 2025-08-18

## 2025-08-18 RX ORDER — FLUTICASONE PROPIONATE 50 MCG
1 SPRAY, SUSPENSION (ML) NASAL DAILY
Qty: 32 G | Refills: 0 | Status: SHIPPED | OUTPATIENT
Start: 2025-08-18

## 2025-08-18 RX ADMIN — SODIUM CHLORIDE 500 ML: 900 INJECTION, SOLUTION INTRAVENOUS at 14:24

## 2025-08-18 RX ADMIN — IOPAMIDOL 100 ML: 755 INJECTION, SOLUTION INTRAVENOUS at 14:44

## 2025-08-18 ASSESSMENT — HEART SCORE: ECG: NORMAL
